# Patient Record
Sex: FEMALE | Race: WHITE | NOT HISPANIC OR LATINO | URBAN - METROPOLITAN AREA
[De-identification: names, ages, dates, MRNs, and addresses within clinical notes are randomized per-mention and may not be internally consistent; named-entity substitution may affect disease eponyms.]

---

## 2017-07-17 ENCOUNTER — TELEPHONE (OUTPATIENT)
Dept: TRANSPLANT | Facility: CLINIC | Age: 47
End: 2017-07-17

## 2017-07-17 NOTE — TELEPHONE ENCOUNTER
Bebe had Emailed stating she was still interested in helping out recip. Recip is now active. Now Emailed Bebe new Phase 1 orders since her prev ones recently .

## 2017-08-02 ENCOUNTER — DOCUMENTATION ONLY (OUTPATIENT)
Dept: TRANSPLANT | Facility: CLINIC | Age: 47
End: 2017-08-02

## 2017-08-04 ENCOUNTER — TELEPHONE (OUTPATIENT)
Dept: TRANSPLANT | Facility: CLINIC | Age: 47
End: 2017-08-04

## 2017-08-04 NOTE — TELEPHONE ENCOUNTER
Informed Bebe that her Phase 1's are all OK. Will now come for eval. Has CD. [place] left [place] in past 3 months. At this time states it is not necessary to have a SW check into financial assist.

## 2017-08-10 DIAGNOSIS — Z00.5 TRANSPLANT DONOR EVALUATION: ICD-10-CM

## 2017-09-14 ENCOUNTER — DOCUMENTATION ONLY (OUTPATIENT)
Dept: TRANSPLANT | Facility: CLINIC | Age: 47
End: 2017-09-14

## 2017-09-21 ENCOUNTER — OFFICE VISIT (OUTPATIENT)
Dept: TRANSPLANT | Facility: CLINIC | Age: 47
End: 2017-09-21
Attending: TRANSPLANT SURGERY

## 2017-09-21 ENCOUNTER — OFFICE VISIT (OUTPATIENT)
Dept: NEPHROLOGY | Facility: CLINIC | Age: 47
End: 2017-09-21
Attending: TRANSPLANT SURGERY

## 2017-09-21 ENCOUNTER — INFUSION THERAPY VISIT (OUTPATIENT)
Dept: INFUSION THERAPY | Facility: CLINIC | Age: 47
End: 2017-09-21
Attending: INTERNAL MEDICINE

## 2017-09-21 ENCOUNTER — ALLIED HEALTH/NURSE VISIT (OUTPATIENT)
Dept: TRANSPLANT | Facility: CLINIC | Age: 47
End: 2017-09-21
Attending: TRANSPLANT SURGERY

## 2017-09-21 VITALS
HEART RATE: 67 BPM | HEIGHT: 63 IN | BODY MASS INDEX: 29.25 KG/M2 | RESPIRATION RATE: 16 BRPM | TEMPERATURE: 97.8 F | OXYGEN SATURATION: 97 % | DIASTOLIC BLOOD PRESSURE: 77 MMHG | WEIGHT: 165.1 LBS | SYSTOLIC BLOOD PRESSURE: 112 MMHG

## 2017-09-21 VITALS — DIASTOLIC BLOOD PRESSURE: 76 MMHG | SYSTOLIC BLOOD PRESSURE: 116 MMHG

## 2017-09-21 VITALS — SYSTOLIC BLOOD PRESSURE: 120 MMHG | DIASTOLIC BLOOD PRESSURE: 80 MMHG

## 2017-09-21 DIAGNOSIS — Z00.5 TRANSPLANT DONOR EVALUATION: ICD-10-CM

## 2017-09-21 DIAGNOSIS — Z00.5 EXAMINATION OF POTENTIAL DONOR OF ORGAN AND TISSUE: Primary | ICD-10-CM

## 2017-09-21 DIAGNOSIS — Z00.5 TRANSPLANT DONOR EVALUATION: Primary | ICD-10-CM

## 2017-09-21 DIAGNOSIS — Z01.818 PRE-OP EXAM: ICD-10-CM

## 2017-09-21 DIAGNOSIS — Z00.5 EXAMINATION OF POTENTIAL DONOR OF ORGAN AND TISSUE: ICD-10-CM

## 2017-09-21 DIAGNOSIS — Z00.5 WILLING TO BE KIDNEY DONOR: Primary | ICD-10-CM

## 2017-09-21 LAB
ABO + RH BLD: NORMAL
ABO + RH BLD: NORMAL
ALBUMIN SERPL-MCNC: 3.8 G/DL (ref 3.4–5)
ALBUMIN UR-MCNC: NEGATIVE MG/DL
ALP SERPL-CCNC: 98 U/L (ref 40–150)
ALT SERPL W P-5'-P-CCNC: 19 U/L (ref 0–50)
ANION GAP SERPL CALCULATED.3IONS-SCNC: 6 MMOL/L (ref 3–14)
APPEARANCE UR: CLEAR
APTT PPP: 30 SEC (ref 22–37)
AST SERPL W P-5'-P-CCNC: 17 U/L (ref 0–45)
BACTERIA #/AREA URNS HPF: ABNORMAL /HPF
BILIRUB SERPL-MCNC: 0.4 MG/DL (ref 0.2–1.3)
BILIRUB UR QL STRIP: NEGATIVE
BLD GP AB SCN SERPL QL: NORMAL
BLOOD BANK CMNT PATIENT-IMP: NORMAL
BUN SERPL-MCNC: 12 MG/DL (ref 7–30)
CALCIUM SERPL-MCNC: 8.7 MG/DL (ref 8.5–10.1)
CHLORIDE SERPL-SCNC: 107 MMOL/L (ref 94–109)
CHOLEST SERPL-MCNC: 196 MG/DL
CMV IGG SERPL QL IA: >8 AI (ref 0–0.8)
CO2 SERPL-SCNC: 27 MMOL/L (ref 20–32)
COLOR UR AUTO: YELLOW
CREAT SERPL-MCNC: 0.7 MG/DL (ref 0.52–1.04)
CREAT UR-MCNC: 154 MG/DL
EBV VCA IGG SER QL IA: 5.8 AI (ref 0–0.8)
EBV VCA IGM SER QL IA: 0.7 AI (ref 0–0.8)
ERYTHROCYTE [DISTWIDTH] IN BLOOD BY AUTOMATED COUNT: 12.3 % (ref 10–15)
GFR SERPL CREATININE-BSD FRML MDRD: 90 ML/MIN/1.7M2
GLUCOSE SERPL-MCNC: 98 MG/DL (ref 70–99)
GLUCOSE UR STRIP-MCNC: NEGATIVE MG/DL
HBA1C MFR BLD: 5.4 % (ref 4.3–6)
HBV CORE AB SERPL QL IA: NONREACTIVE
HBV SURFACE AB SERPL IA-ACNC: 27.97 M[IU]/ML
HBV SURFACE AG SERPL QL IA: NONREACTIVE
HCT VFR BLD AUTO: 41.4 % (ref 35–47)
HCV AB SERPL QL IA: NONREACTIVE
HDLC SERPL-MCNC: 71 MG/DL
HGB BLD-MCNC: 13.9 G/DL (ref 11.7–15.7)
HGB UR QL STRIP: NEGATIVE
HIV 1+2 AB+HIV1 P24 AG SERPL QL IA: NONREACTIVE
INR PPP: 0.95 (ref 0.86–1.14)
KETONES UR STRIP-MCNC: NEGATIVE MG/DL
LDLC SERPL CALC-MCNC: 102 MG/DL
LEUKOCYTE ESTERASE UR QL STRIP: NEGATIVE
MCH RBC QN AUTO: 29.7 PG (ref 26.5–33)
MCHC RBC AUTO-ENTMCNC: 33.6 G/DL (ref 31.5–36.5)
MCV RBC AUTO: 89 FL (ref 78–100)
MICROALBUMIN UR-MCNC: 9 MG/L
MICROALBUMIN/CREAT UR: 5.95 MG/G CR (ref 0–25)
MUCOUS THREADS #/AREA URNS LPF: PRESENT /LPF
NITRATE UR QL: NEGATIVE
NONHDLC SERPL-MCNC: 125 MG/DL
PH UR STRIP: 6 PH (ref 5–7)
PHOSPHATE SERPL-MCNC: 2.4 MG/DL (ref 2.5–4.5)
PLATELET # BLD AUTO: 224 10E9/L (ref 150–450)
POTASSIUM SERPL-SCNC: 4 MMOL/L (ref 3.4–5.3)
PROT SERPL-MCNC: 7.2 G/DL (ref 6.8–8.8)
PROT UR-MCNC: 0.09 G/L
PROT/CREAT 24H UR: 0.06 G/G CR (ref 0–0.2)
RBC # BLD AUTO: 4.68 10E12/L (ref 3.8–5.2)
RBC #/AREA URNS AUTO: 1 /HPF (ref 0–2)
SODIUM SERPL-SCNC: 140 MMOL/L (ref 133–144)
SOURCE: ABNORMAL
SP GR UR STRIP: 1.02 (ref 1–1.03)
SPECIMEN EXP DATE BLD: NORMAL
SQUAMOUS #/AREA URNS AUTO: 1 /HPF (ref 0–1)
T PALLIDUM IGG+IGM SER QL: NEGATIVE
TRIGL SERPL-MCNC: 117 MG/DL
URATE SERPL-MCNC: 4.6 MG/DL (ref 2.6–6)
UROBILINOGEN UR STRIP-MCNC: 2 MG/DL (ref 0–2)
WBC # BLD AUTO: 6.2 10E9/L (ref 4–11)
WBC #/AREA URNS AUTO: 1 /HPF (ref 0–2)

## 2017-09-21 PROCEDURE — 86665 EPSTEIN-BARR CAPSID VCA: CPT | Performed by: INTERNAL MEDICINE

## 2017-09-21 PROCEDURE — 86900 BLOOD TYPING SEROLOGIC ABO: CPT | Performed by: INTERNAL MEDICINE

## 2017-09-21 PROCEDURE — 85027 COMPLETE CBC AUTOMATED: CPT | Performed by: INTERNAL MEDICINE

## 2017-09-21 PROCEDURE — 86850 RBC ANTIBODY SCREEN: CPT | Performed by: INTERNAL MEDICINE

## 2017-09-21 PROCEDURE — 86644 CMV ANTIBODY: CPT | Performed by: INTERNAL MEDICINE

## 2017-09-21 PROCEDURE — 82542 COL CHROMOTOGRAPHY QUAL/QUAN: CPT | Performed by: INTERNAL MEDICINE

## 2017-09-21 PROCEDURE — 84550 ASSAY OF BLOOD/URIC ACID: CPT | Performed by: INTERNAL MEDICINE

## 2017-09-21 PROCEDURE — 83036 HEMOGLOBIN GLYCOSYLATED A1C: CPT | Performed by: INTERNAL MEDICINE

## 2017-09-21 PROCEDURE — 40000269 ZZH STATISTIC NO CHARGE FACILITY FEE: Mod: ZF

## 2017-09-21 PROCEDURE — 86704 HEP B CORE ANTIBODY TOTAL: CPT | Performed by: INTERNAL MEDICINE

## 2017-09-21 PROCEDURE — 86803 HEPATITIS C AB TEST: CPT | Performed by: INTERNAL MEDICINE

## 2017-09-21 PROCEDURE — 25000128 H RX IP 250 OP 636: Mod: JW,ZF | Performed by: INTERNAL MEDICINE

## 2017-09-21 PROCEDURE — 85730 THROMBOPLASTIN TIME PARTIAL: CPT | Performed by: INTERNAL MEDICINE

## 2017-09-21 PROCEDURE — 81001 URINALYSIS AUTO W/SCOPE: CPT | Performed by: INTERNAL MEDICINE

## 2017-09-21 PROCEDURE — 86480 TB TEST CELL IMMUN MEASURE: CPT | Performed by: INTERNAL MEDICINE

## 2017-09-21 PROCEDURE — 84156 ASSAY OF PROTEIN URINE: CPT | Performed by: INTERNAL MEDICINE

## 2017-09-21 PROCEDURE — 85610 PROTHROMBIN TIME: CPT | Performed by: INTERNAL MEDICINE

## 2017-09-21 PROCEDURE — 86780 TREPONEMA PALLIDUM: CPT | Performed by: INTERNAL MEDICINE

## 2017-09-21 PROCEDURE — 40000866 ZZHCL STATISTIC HIV 1/2 ANTIGEN/ANTIBODY PRETRANSPLANT ONLY: Performed by: INTERNAL MEDICINE

## 2017-09-21 PROCEDURE — 86706 HEP B SURFACE ANTIBODY: CPT | Performed by: INTERNAL MEDICINE

## 2017-09-21 PROCEDURE — 87340 HEPATITIS B SURFACE AG IA: CPT | Performed by: INTERNAL MEDICINE

## 2017-09-21 PROCEDURE — 80053 COMPREHEN METABOLIC PANEL: CPT | Performed by: INTERNAL MEDICINE

## 2017-09-21 PROCEDURE — 82043 UR ALBUMIN QUANTITATIVE: CPT | Performed by: INTERNAL MEDICINE

## 2017-09-21 PROCEDURE — 80061 LIPID PANEL: CPT | Performed by: INTERNAL MEDICINE

## 2017-09-21 PROCEDURE — 86901 BLOOD TYPING SEROLOGIC RH(D): CPT | Performed by: INTERNAL MEDICINE

## 2017-09-21 PROCEDURE — 84100 ASSAY OF PHOSPHORUS: CPT | Performed by: INTERNAL MEDICINE

## 2017-09-21 RX ORDER — SERTRALINE HYDROCHLORIDE 100 MG/1
100 TABLET, FILM COATED ORAL
COMMUNITY
Start: 2017-08-25

## 2017-09-21 RX ORDER — LEVOTHYROXINE SODIUM 150 UG/1
150 TABLET ORAL
COMMUNITY
Start: 2017-08-25

## 2017-09-21 RX ORDER — ESTRADIOL 1 MG/1
1 TABLET ORAL
COMMUNITY
Start: 2017-08-29

## 2017-09-21 RX ADMIN — IOHEXOL 5 ML: 300 INJECTION, SOLUTION INTRAVENOUS at 08:46

## 2017-09-21 ASSESSMENT — PAIN SCALES - GENERAL: PAINLEVEL: NO PAIN (0)

## 2017-09-21 NOTE — MR AVS SNAPSHOT
After Visit Summary   9/21/2017    Lucille Lim    MRN: [id number]           Patient Information     Date Of Birth          1970        Visit Information        Provider Department      9/21/2017 10:00 AM Desi Toro S.A., RN [facility] [place] [facility] Solid Organ Transplant        Today's Diagnoses     Examination of potential donor of organ and tissue    -  1       Follow-ups after your visit        Your next 10 appointments already scheduled     Sep 21, 2017  3:00 PM CDT   (Arrive by 2:45 PM)   CT RENAL ANGIO with UCCT2   [facility] [place] [facility] [place] [facility] ([facility])    [address] [address] [phone]           Please bring any scans or X-rays taken at other hospitals, if similar tests were done. Also bring a list of your medicines, including vitamins, minerals and over-the-counter drugs. It is safest to leave personal items at home.  Be sure to tell your doctor:   If you have any allergies.   If there s any chance you are pregnant.   If you are breastfeeding.   If you have any special needs.  You will have contrast for this exam. To prepare:   Do not eat or drink for 2 hours before your exam. If you need to take medicine, you may take it with small sips of water. (We may ask you to take liquid medicine as well.)   The day before your exam, drink extra fluids at least six 8-ounce glasses (unless your doctor tells you to restrict your fluids).  Patients over 70 or patients with diabetes or kidney problems:   If you haven t had a blood test (creatinine test) within the last 30 days, go to your clinic or Diagnostic Imaging Department for this test.  If you have diabetes:   If your kidney function is normal, continue taking your metformin (Avandamet, Glucophage, Glucovance, Metaglip) on the day of your exam.   If your kidney function is abnormal, wait 48 hours before restarting this medicine.  Please wear loose clothing, such as a sweat suit or  "jogging clothes. Avoid snaps, zippers and other metal. We may ask you to undress and put on a hospital gown.  If you have any questions, please call the Imaging Department where you will have your exam.            Sep 21, 2017  3:20 PM CDT   (Arrive by 3:05 PM)   XR CHEST 2 VIEWS with UCXR1   [facility] Xray ([facility])    [address]  1st Floor  [facility] [address] [phone]           Please bring a list of your current medicines to your exam. (Include vitamins, minerals and over-thecounter medicines.) Leave your valuables at home.  Tell your doctor if there is a chance you may be pregnant.  You do not need to do anything special for this exam.              Future tests that were ordered for you today     Open Future Orders        Priority Expected Expires Ordered    ABO type Routine 9/21/2017 10/27/2017 9/21/2017            Who to contact     If you have questions or need follow up information about today's clinic visit or your schedule please contact [facility] SOLID ORGAN TRANSPLANT directly at [phone].  Normal or non-critical lab and imaging results will be communicated to you by [email or web address].comhart, letter or phone within 4 business days after the clinic has received the results. If you do not hear from us within 7 days, please contact the clinic through CoachUpt or phone. If you have a critical or abnormal lab result, we will notify you by phone as soon as possible.  Submit refill requests through [employer] or call your pharmacy and they will forward the refill request to us. Please allow 3 business days for your refill to be completed.          Additional Information About Your Visit        RecentPoker.comhar[employer] Information     [employer] lets you send messages to your doctor, view your test results, renew your prescriptions, schedule appointments and more. To sign up, go to [email or web address] . Click on \"Log in\" on the left side of the screen, which will take you to the " "Welcome page. Then click on \"Sign up Now\" on the right side of the page.     You will be asked to enter the access code listed below, as well as some personal information. Please follow the directions to create your username and password.     Your access code is: [email or web address]  Expires: 2017  6:30 AM     Your access code will  in 90 days. If you need help or a new code, please call your [place] clinic or [phone].        Care EveryWhere ID     This is your Care EveryWhere ID. This could be used by other organizations to access your [place] medical records  [id number]         Blood Pressure from Last 3 Encounters:   17 116/76   17 120/80   17 112/77    Weight from Last 3 Encounters:   17 74.9 kg (165 lb 1.6 oz)              Today, you had the following     No orders found for display       Primary Care Provider    Provider Not In System                Equal Access to Services     [facility]: Hadii aad ku hadasho Sobarbieali, waaxda luqadaha, qaybta kaalmada adeegyada, waxay derikin hayjennifern levon malcolm . So [facility] [phone].    ATENCIÓN: Si habla español, tiene a campos disposición servicios gratuitos de asistencia lingüística. Llame al [phone].    We comply with applicable federal civil rights laws and [place] laws. We do not discriminate on the basis of race, color, national origin, age, disability sex, sexual orientation or gender identity.            Thank you!     Thank you for choosing [facility] SOLID ORGAN TRANSPLANT  for your care. Our goal is always to provide you with excellent care. Hearing back from our patients is one way we can continue to improve our services. Please take a few minutes to complete the written survey that you may receive in the mail after your visit with us. Thank you!             Your Updated Medication List - Protect others around you: Learn how to safely use, store and throw away your medicines at www.disposemymeds.org.          This " list is accurate as of: 9/21/17  2:59 PM.  Always use your most recent med list.                   Brand Name Dispense Instructions for use Diagnosis    estradiol 1 MG tablet    ESTRACE     Take 1 mg by mouth        levothyroxine 150 MCG tablet    SYNTHROID/LEVOTHROID     Take 150 mcg by mouth        sertraline 100 MG tablet    ZOLOFT     Take 100 mg by mouth        VITAMIN D (CHOLECALCIFEROL) PO      Take 5,000 Units by mouth daily        ZANTAC 150 MG tablet   Generic drug:  ranitidine      Take 150 mg by mouth 2 times daily

## 2017-09-21 NOTE — MR AVS SNAPSHOT
After Visit Summary   9/21/2017    Lucille Lim    MRN: [id number]           Patient Information     Date Of Birth          1970        Visit Information        Provider Department      9/21/2017 9:00 AM Holli Hughes [facility] Solid Organ Transplant        Today's Diagnoses     Transplant donor evaluation    -  1       Follow-ups after your visit        Your next 10 appointments already scheduled     Sep 21, 2017  1:00 PM CDT   (Arrive by 12:30 PM)   Kidney Donor Evaluation with Aubrey Thompson MD   [facility] Nephrology ([facility])    [address] [place] [address]   [phone]            Sep 21, 2017  2:00 PM CDT   NUTRITION VISIT with Juanita Carmichael RD   [facility] Solid Organ Transplant ([facility])    [address] [place] [address]   [phone]            Sep 21, 2017  3:00 PM CDT   (Arrive by 2:45 PM)   CT RENAL ANGIO with UCCT2   [facility] [place] [facility] ([facility])    [address] [place] [address]   [phone]           Please bring any scans or X-rays taken at other hospitals, if similar tests were done. Also bring a list of your medicines, including vitamins, minerals and over-the-counter drugs. It is safest to leave personal items at home.  Be sure to tell your doctor:   If you have any allergies.   If there s any chance you are pregnant.   If you are breastfeeding.   If you have any special needs.  You will have contrast for this exam. To prepare:   Do not eat or drink for 2 hours before your exam. If you need to take medicine, you may take it with small sips of water. (We may ask you to take liquid medicine as well.)   The day before your exam, drink extra fluids at least six 8-ounce glasses (unless your doctor tells you to restrict your fluids).  Patients over 70 or patients with diabetes  or kidney problems:   If you haven t had a blood test (creatinine test) within the last 30 days, go to your clinic or Diagnostic Imaging Department for this test.  If you have diabetes:   If your kidney function is normal, continue taking your metformin (Avandamet, Glucophage, Glucovance, Metaglip) on the day of your exam.   If your kidney function is abnormal, wait 48 hours before restarting this medicine.  Please wear loose clothing, such as a sweat suit or jogging clothes. Avoid snaps, zippers and other metal. We may ask you to undress and put on a hospital gown.  If you have any questions, please call the Imaging Department where you will have your exam.            Sep 21, 2017  3:20 PM CDT   (Arrive by 3:05 PM)   XR CHEST 2 VIEWS with UCXR1   [facility]ray ([facility])    [address] [address] [phone]           Please bring a list of your current medicines to your exam. (Include vitamins, minerals and over-thecounter medicines.) Leave your valuables at home.  Tell your doctor if there is a chance you may be pregnant.  You do not need to do anything special for this exam.              Future tests that were ordered for you today     Open Future Orders        Priority Expected Expires Ordered    ABO type Routine 9/21/2017 10/27/2017 9/21/2017            Who to contact     If you have questions or need follow up information about today's clinic visit or your schedule please contact [facility] SOLID ORGAN TRANSPLANT directly at [phone].  Normal or non-critical lab and imaging results will be communicated to you by Yippee Artshart, letter or phone within 4 business days after the clinic has received the results. If you do not hear from us within 7 days, please contact the clinic through Expensify or phone. If you have a critical or abnormal lab result, we will notify you by phone as soon as possible.  Submit refill requests through [employer]  "or call your pharmacy and they will forward the refill request to us. Please allow 3 business days for your refill to be completed.          Additional Information About Your Visit        MyChart Information     "[employer]hart lets you send messages to your doctor, view your test results, renew your prescriptions, schedule appointments and more. To sign up, go to [email or web address] . Click on \"Log in\" on the left side of the screen, which will take you to the Welcome page. Then click on \"Sign up Now\" on the right side of the page.     You will be asked to enter the access code listed below, as well as some personal information. Please follow the directions to create your username and password.     Your access code is: [email or web address]  Expires: 2017  6:30 AM     Your access code will  in 90 days. If you need help or a new code, please call your [place] clinic or [phone].        Care EveryWhere ID     This is your Care EveryWhere ID. This could be used by other organizations to access your [place] medical records  [id number]         Blood Pressure from Last 3 Encounters:   17 112/77    Weight from Last 3 Encounters:   17 74.9 kg (165 lb 1.6 oz)              Today, you had the following     No orders found for display       Primary Care Provider    Provider Not In System                Equal Access to Services     [facility]: Hadii aad ku hadasho Sobarbieali, waaxda luqadaha, qaybta kaalmada adeegyada, nixon malcolm . So [facility] [phone].    ATENCIÓN: Si habla español, tiene a campos disposición servicios gratuitos de asistencia lingüística. Llame al [phone].    We comply with applicable federal civil rights laws and [place] laws. We do not discriminate on the basis of race, color, national origin, age, disability sex, sexual orientation or gender identity.            Thank you!     Thank you for choosing [facility] [place] [facility] SOLID ORGAN TRANSPLANT  for your care. Our goal " is always to provide you with excellent care. Hearing back from our patients is one way we can continue to improve our services. Please take a few minutes to complete the written survey that you may receive in the mail after your visit with us. Thank you!             Your Updated Medication List - Protect others around you: Learn how to safely use, store and throw away your medicines at www.disposemymeds.org.          This list is accurate as of: 9/21/17 12:41 PM.  Always use your most recent med list.                   Brand Name Dispense Instructions for use Diagnosis    estradiol 1 MG tablet    ESTRACE     Take 1 mg by mouth        levothyroxine 150 MCG tablet    SYNTHROID/LEVOTHROID     Take 150 mcg by mouth        sertraline 100 MG tablet    ZOLOFT     Take 100 mg by mouth        VITAMIN D (CHOLECALCIFEROL) PO      Take 5,000 Units by mouth daily        ZANTAC 150 MG tablet   Generic drug:  ranitidine      Take 150 mg by mouth 2 times daily

## 2017-09-21 NOTE — MR AVS SNAPSHOT
After Visit Summary   9/21/2017    Lucille Lim    MRN: [id number]           Patient Information     Date Of Birth          1970        Visit Information        Provider Department      9/21/2017 12:30 PM Oswald Go MD [facility] Solid Organ Transplant        Today's Diagnoses     Transplant donor evaluation    -  1       Follow-ups after your visit        Your next 10 appointments already scheduled     Sep 21, 2017 10:40 AM CDT   (Arrive by 10:25 AM)   ecg with  CV EKG   [facility] Xray ([facility])    [address] [place] [id number] [phone]            Sep 21, 2017 12:30 PM CDT   (Arrive by 12:15 PM)   Kidney Donor Evaluation with Oswald Go MD   [facility] Solid Organ Transplant ([facility])    [address] [place] [address]   [phone]            Sep 21, 2017  1:00 PM CDT   (Arrive by 12:30 PM)   Kidney Donor Evaluation with Aubrey Thompson MD   [facility] Nephrology ([facility])    [address] [place] [address]   [phone]            Sep 21, 2017  2:00 PM CDT   NUTRITION VISIT with Juanita Carmichael RD   [facility] Solid Organ Transplant ([facility])    [address] [place] [address]   [phone]            Sep 21, 2017  3:00 PM CDT   (Arrive by 2:45 PM)   CT RENAL ANGIO with UCCT2   [facility] CT ([facility])    [address] [place] [id number] [phone]           Please bring any scans or X-rays taken at other hospitals, if similar tests were done. Also bring a list of your medicines, including vitamins, minerals and over-the-counter drugs. It is safest to leave personal items at home.  Be sure to tell your doctor:   If you have any allergies.   If there s any  chance you are pregnant.   If you are breastfeeding.   If you have any special needs.  You will have contrast for this exam. To prepare:   Do not eat or drink for 2 hours before your exam. If you need to take medicine, you may take it with small sips of water. (We may ask you to take liquid medicine as well.)   The day before your exam, drink extra fluids at least six 8-ounce glasses (unless your doctor tells you to restrict your fluids).  Patients over 70 or patients with diabetes or kidney problems:   If you haven t had a blood test (creatinine test) within the last 30 days, go to your clinic or Diagnostic Imaging Department for this test.  If you have diabetes:   If your kidney function is normal, continue taking your metformin (Avandamet, Glucophage, Glucovance, Metaglip) on the day of your exam.   If your kidney function is abnormal, wait 48 hours before restarting this medicine.  Please wear loose clothing, such as a sweat suit or jogging clothes. Avoid snaps, zippers and other metal. We may ask you to undress and put on a hospital gown.  If you have any questions, please call the Imaging Department where you will have your exam.            Sep 21, 2017  3:20 PM CDT   (Arrive by 3:05 PM)   XR CHEST 2 VIEWS with UCXR1   [facility] [place] [facility]ray ([facility])    [address] [place] [address] [phone]           Please bring a list of your current medicines to your exam. (Include vitamins, minerals and over-thecounter medicines.) Leave your valuables at home.  Tell your doctor if there is a chance you may be pregnant.  You do not need to do anything special for this exam.              Future tests that were ordered for you today     Open Future Orders        Priority Expected Expires Ordered    ABO type Routine 9/21/2017 10/27/2017 9/21/2017            Who to contact     If you have questions or need follow up information about today's clinic visit or  "your schedule please contact [facility] SOLID ORGAN TRANSPLANT directly at [phone].  Normal or non-critical lab and imaging results will be communicated to you by MyChart, letter or phone within 4 business days after the clinic has received the results. If you do not hear from us within 7 days, please contact the clinic through MyChart or phone. If you have a critical or abnormal lab result, we will notify you by phone as soon as possible.  Submit refill requests through [employer] or call your pharmacy and they will forward the refill request to us. Please allow 3 business days for your refill to be completed.          Additional Information About Your Visit        [employer] Software[employer] Information     [employer] lets you send messages to your doctor, view your test results, renew your prescriptions, schedule appointments and more. To sign up, go to [email or web address] . Click on \"Log in\" on the left side of the screen, which will take you to the Welcome page. Then click on \"Sign up Now\" on the right side of the page.     You will be asked to enter the access code listed below, as well as some personal information. Please follow the directions to create your username and password.     Your access code is: [email or web address]  Expires: 2017  6:30 AM     Your access code will  in 90 days. If you need help or a new code, please call your [place] clinic or [phone].        Care EveryWhere ID     This is your Care EveryWhere ID. This could be used by other organizations to access your [place] medical records  [id number]         Blood Pressure from Last 3 Encounters:   17 112/77    Weight from Last 3 Encounters:   17 74.9 kg (165 lb 1.6 oz)              Today, you had the following     No orders found for display       Primary Care Provider    Provider Not In System                Equal Access to Services     YANA TIRADO : Corinne Allan, federico goldman, nixon zaman " mamie munirbrad jamesdex lalakia ah. So [facility] [phone].    ATENCIÓN: Si habla corwin, tiene a campos disposición servicios gratuitos de asistencia lingüística. Eulogio al [phone].    We comply with applicable federal civil rights laws and [place] laws. We do not discriminate on the basis of race, color, national origin, age, disability sex, sexual orientation or gender identity.            Thank you!     Thank you for choosing [facility] SOLID ORGAN TRANSPLANT  for your care. Our goal is always to provide you with excellent care. Hearing back from our patients is one way we can continue to improve our services. Please take a few minutes to complete the written survey that you may receive in the mail after your visit with us. Thank you!             Your Updated Medication List - Protect others around you: Learn how to safely use, store and throw away your medicines at www.disposemymeds.org.          This list is accurate as of: 9/21/17 10:11 AM.  Always use your most recent med list.                   Brand Name Dispense Instructions for use Diagnosis    estradiol 1 MG tablet    ESTRACE     Take 1 mg by mouth        levothyroxine 150 MCG tablet    SYNTHROID/LEVOTHROID     Take 150 mcg by mouth        sertraline 100 MG tablet    ZOLOFT     Take 100 mg by mouth        VITAMIN D (CHOLECALCIFEROL) PO      Take 5,000 Units by mouth daily        ZANTAC 150 MG tablet   Generic drug:  ranitidine      Take 150 mg by mouth 2 times daily

## 2017-09-21 NOTE — PROGRESS NOTES
Psychosocial Evaluation  Living Organ Donation per OPTN Policy 14.1.A  Organ Type: unrelated kidney  Presenting Information:  Ms. Lan Lim presents to the [facility] to complete a psychosocial evaluation since she is interested in becoming a kidney donor to her friend, Ms. Holli Fung, age 36.  Ms. Lim is a 47 year old, ,white, American female.  She is a [place] Citizen.  She is in clinic alone today.    PERSONAL BACKGROUND:  Current Living Situation: Ms. Lim currently lives in [place], [place], which is a 2 1/2 hour drive from the [facility].  She lives in a single family home that she owns with her boyfriend.    Education/Employment/Financial Situation: Ms. Lim completed a 4 year degree.  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  She has insurance through her [employer] job.  She estimates that her household income is $75,000 per year.  She does not have paid time off from her hospital job, since she is casual.  She does have some donor leave benefits from her job as a dialysis nurse through [employer].  Ms. Lim denies that she will suffer a financial hardship as a result of living kidney donation.  We discussed her applying for NLDAC, since it appears that her recipient may be within income guidelines so she would qualify.    Family History: Ms. Lim has one son, age 23, who is living and working full time as a  in [place]. Ms. Lim has been  for 20 years.  She is in a relationship with her boyfriend, Carlos Alberto, for the last 18 years.  They live together.     General Health: Ms. Lim considers herself to be in excellent general health.  She has had surgery, a hysterectomy, in the past and recovered well from that procedure.      Mental  Health: Ms. Lim has taking Zoloft for management of mild depression for the 10 years.  She denies any history of suicidal ideation, plans, or past attempts.  She denies any history of psychiatric hospitalizations.  She feels that her depression is well managed on Zoloft and does not seem to interfere with her job performance or relationships.    Alcohol and Drug Use/Abuse/Dependency: Denies any past history of abuse or dependency on alcohol or illicit drugs. Denies any current use of street drugs, including marijuana.  Denies any past history of negative consequences of use of alcohol or drugs such as a DUI, relationship problems, or problems with work or home life.   Ms. Lim reports that she drinks every few months, when they travel or are with friends.  She may have 4 or 5 servings of alcohol when she drinks.    Cigarette Use: Ms. Lim denies smoking cigarettes.  She quit smoking in 2008.  She used to smoke 1 pack per day for 15 years.    Legal: Ms. Lim denies any legal issues.    Coping Strategies/Support System: Ms. Lim reports that her long time boyfriend, Carlos Alberto, is not fully supportive of her desire to be a living kidney donor.  She states that his objective is that the potential recipient has had many social problems including substance abuse.  Ms. Lim states that she thinks that Carlos Alberto will support her if she ultimately decides to be a donor, but she knows that she will need his help and support.    DONOR SPECIFIC INFORMATION:  Relationship to Recipient: Ms. Lucille Lim wants to donate a kidney to a friend, Ms. Holli Fung, age 36.  Holli used to be in a relationship with Ms. Lim's son for about 5 years.  They are no longer in a relationship together.  Ms. Lim reports that she does not know all of the details of Holli's kidney disease, but does know that she had a kidney transplant from her uncle when she was 10 years old.  She is currently on dialysis  "and has been for several years.    Decision Process/Motivation to Donate: Ms. Lim reports that she is motivated to help Holli Fung because \"she deserves a second chance in life\".  Ms. Lim states that she has had some really tough family dynamics and some karen road as a young adult.  Ms. Lim states that she would prefer to be part of a paired kidney donation program, even if she is able to give to Holli directly.  She states that since they both live in [place] very [place] town, she would prefer if her actual kidney go to someone else.  She feels that this way, she would not feel like she had judgements about Holli's behavior should they see one another out in the community.  Ms. Lim denies feeling any pressure or coercion.  She denies being offered any form of payment to be a donor.      PREPARATION FOR DONATION, RECOVERY, AND POTENTIAL SHORT-LONG-TERM OUTCOMES:  Understanding of the Living Donation Process:   We discussed the role of the donor  and Independent Donor Advocate.  Short and long term medical and psychosocial risks to both, donor and recipient were reviewed and she appears to understand these risks.  High risk behaviors as defined by [place] Public Health Services (PHS) 2013 that have potential to increase risk of disease transmission were reviewed and she denies high risk behaviors. Post surgical restrictions (2 weeks no driving, 6 weeks no lifting over 10 lbs) were reviewed and she appears capable of adhering to the post surgical requirements. The need for a caregiver was discussed and she would need support from her partner, Carlos Alberto,who is still somewhat on the fence about his support of her being a living kidney donor .  The risk of poor psychosocial outcome including problems with body image, post-surgery depression or anxiety, or feelings of emotional distress or bereavement if recipient experiences any recurrent disease, poor outcome or death was reviewed.  " Additionally, potential financial implications, including the risk of having difficulty obtaining health care insurance, life insurance, disability insurance, or long term care insurance were reviewed, as were available donor grants to assist with donor related expenses.      We also discussed some unique issues that arise with paired kidney donation, which include the uncertainty of the timing and the importance of having a employment situation and support system that is able to provide sustained support and flexibility.    Ms. Lim appears capable of understanding this information and making an informed medical decision.    Impressions/Recommendations:   Ms. Lucille Lim  is highly motivated to donate kidney  to her friend, Ms. Holli Fung, age 36.  Her decision to donate is free of inducement, coercion, or other undue pressure.   Her housing, finances and employment are stable.  No current/active mental health or chemical abuse issues were identified.  The need for a caregiver was reviewed and she is able to identify a plan to meet her post operative care needs.  She appears capable of making an informed medical decision.  No psychosocial contraindications to living organ donation were identified and  I support Ms. Lim s desire to donate a kidney to her friend, Ms. Holli Fung.         Contact Information:   DANIA Griffin, [facility]  Clinical  and Independent Donor Advocate  [facility]  Pager:  [phone]  Direct:  [phone]      Time Spent: 60 minutes      Living Kidney Donor Consent per OPTN Policy 14.3.A for Independent Living Donor Advocate (JAGRUTI)    Written assurance has been obtained from the potential donor that he/she:   Is willing to donate  Is free from inducement and coercion  Has been informed that the he/she may decline to donate at any time  Has been informed that transplant centers must:   A) Offer donors an  opportunity to discontinue the donor consent or evaluation process in a way that is protected and confidential  B) Provide an independent living donor advocate (JAGRUTI) to assist the potential donor during this process    The following was presented to the potential donor in a language in which the potential donor is able to engage in meaningful dialogue:   Education and instruction about all phases of the living donation process including:   Consent  Medical and psychosocial evaluations  Pre and post operative care  Required post operative follow up  Disclosure that the [facility] will take all reasonable precautions to provide confidentiality for the donor/recipient  Disclosure that it is a federal crime for any person to knowingly acquire, obtain or otherwise transfer any human organ for valuable consideration  Disclosure that the [facility] must provide an independent living donor advocate (JAGRUTI)  Disclosure that health information obtained during the evaluation is subject to the same regulations as all records and could reveal conditions that must be reported to local, state, or federal public health authorities  Disclosure that the [facility] is required to report living donor follow up information at 6 months, 1 year, and 2 years, and that the potential donor must commit to post operative follow up testing coordinated by the [facility]    Disclosure has been provided that these risks may be transient or permanent & include but are not limited to:  Potential psychosocial risks:  Problems with body image  Post-surgery depression or anxiety  Feelings of emotional distress or bereavement if recipient experiences any recurrent disease or in the event of the recipient s death  Impact of donation on the donor s lifestyle    Potential financial impacts:  Personal expenses of travel, housing, , lost wages related to donation might not be reimbursed. However, resources may be  available to defray some donation-related expenses   Need for life-long follow up at the donor s expense  Loss of employment or income  Negative impact on the ability to obtain future employment  Negative impact on the ability to obtain, maintain, or afford health, disability, and life insurance  Future health problems experienced by living donors following donation may not be covered by the recipient s insurance    Contact Information:  DANIA Griffin, [facility]  Clinical  and Independent Donor Advocate  [facility]  Pager:  [phone]  Direct:  [phone]    Time Spent: 60 minutes

## 2017-09-21 NOTE — NURSING NOTE
Saw Lucille Lim in clinic during kidney donor evaluation.  Has offered to be donor to Paired exchange program for Holli Fung.  Lucille would like to choose to participate in Kidney paired exchange rather than give directly to the recipient.  Reason being that they [place] town and she would like to remove herself from giving directly to her, but still do something to benefit Holli.    Discussed surgery hospitalization and recovery.  Knows when and how to obtain evaluation results and the process for scheduling surgery.  Has received and reviewed the donor education materials including donor DVD.  Signed consent for donor evaluation.  Reviewed SRTR Datasheet.  She was [place] in the [place]  She lives in [place], her family has a beef livestock.  She works as a nurse.  Requested routine cancer screening tests:     1.Pap.- She does not need as the patient has had a total hysterectomy.     2.  Mammo      Time spent 20 minutes.      I reviewed details pertaining to the Paired Exchange programs.       1. National Kidney Registry    2. [place] for Paired donation    3. [facility] KPD Program    4. Internal Exchange at the [facility]    Matching Procedure and Logistics    Reviewed that donors and candidates do not choose their match and that they may decline a match. Explained examples of potential chain/swap scenarios and demonstrated how more than one candidate can receive a transplant in these exchanges.   Reviewed that the [facility] waiting time is unknown, the intended recipient's antibody panel and reviewed their ABO match power.   Frequent lab draws are necessary in the D programs. APD and NKR will send a kit when we initiate the listing to store the blood by freezing the sample and use it for exploratory crossmatches. Once a match offer is made, a fresh blood sample will be needed for the final confirmatory compatibility testing, as well as infectious disease testing.  I reviewed billing policy  for the donor evaluation and for the KPD program.  Bridging  KPD programs may need to have the donor wait a period of time after the recipient receives their kidney, in order to determine matching and logistics for the next cluster of a chain. If donor consents to be a bridge donor, the time waiting for the swap cluster to take place is unknown. The donor stated that she DOES NOT want to be a bridge donor. Further blood work may be needed if they are waiting to bridge one year after the initial evaluation.  Unexpected Outcome  I discussed possibility of an unexpected event on the day of transplant. I explained how their donated kidney would be backed up here locally as well as in the destination city in the event that the matched recipient is unable to receive the donated kidney.   I reviewed the KPD programs remedy for failed KPD exchanges, and the remedy does not include any additional priority for the paired candidate on the  donor waiting list. The outcome of the matched recipient may be different from the intended recipient's outcome.  Shipping  The kidney's are transported to the matched recipient's in the exchanges via an approved  service to the airport and then flown fixed wing to the intended destination.GPS devices are used in all the NKR exchanges for close monitoring. Risk included in shipping include damage or loss related to unforseen situations; car crash, airplane crash, terrorist events, etc.   Communication  Donors can communicate with the recipient by giving all correspondence to the Transplant Coordinator, omitting all personal identifiers. The Transplant Coordinator will review and deliver to the recipients Coordinator.  Rights  Donors have a right to withdraw from participation in the KPD program at any time.     Donor has signed consent for:  *National Kidney Registry  *[employer] for Organ Sharing, KPD  *[place] for Paired Donation  *[facility] Internal Exchange  *Bridge  Donation  *Shipping Risks  *Release of Information form used for sharing evaluation clinical data to recipient transplant center.     Questions answered.      Living Kidney Donor Consent per OPTN Policy for Transplant Coordinators     Written assurance has been obtained from the patient that the donor:   1) Is willing to donate  2) Is free from inducement and coercion  3) Has been informed that the donor may decline to donate at any time  4) Has been informed that transplant centers must:   A) Offer donors an opportunity to discontinue the donor consent or evaluation process in a way that is protected and confidential  B) Provide an independent donor advocate (LENIN) to assist the potential donor during this process     The following was presented in a language in which donor is able to engage in meaningful dialogue and was reviewed and discussed with the patient by the transplant coordinator and the physician.      The following has been provided:   Education and instruction about all phases of the living donation process includin) Consent  2) The donor must undergo medical and psychosocial evaluations  3) Disclosure that the recovery hospital will take all reasonable precautions to provide confidentiality for the donor/recipient  4) Disclosure that it is a federal crime for any person to knowingly acquire, obtain or otherwise transfer any human organ for valuable consideration  5) The transplant hospital may refuse the potential donor, and the donor could be evaluated by another transplant program with different selection criteria  6) Data from the most recent SRTR center-specific reports:   A) National 1-year patient and graft survival rates  B) Hospital s 1-year patient and graft survival rates  C) Notification about all CMS outcome requirements not being met by the recovery and recipient s hospitals     The following has been provided:   1) Education about expected post-donation kidney function and how  chronic kidney disease and end-stage renal disease might potentially impact the donor in the future to include:  A) On average, donors will have 25-35% permanent loss of kidney function at donation  B) Baseline risk of End Stage Renal Disease (ESRD) does not exceed that of members of general population with same demographic profile  C) Donor risks must be interpreted in light of known epidemiology of both Chronic Kidney Disease (CKD) or ESRD  D) CKD generally develops in midlife (40-50 years old)  E) ESRD generally develops after age 60  F) Medical evaluation of young potential donor cannot predict lifetime risk  2) Donors may be at higher risk for CKD if they sustain damage to the remaining kidney. 3) Development of CKD and progression to ESRD may be more rapid with only 1 kidney  4) Dialysis is required when reaching ESRD  5) Current practice prioritizes prior living kidney donors who became kidney transplant candidates  6) Alternate procedures or courses of treatment for the recipient, including  donor transplant  A) A  donor kidney may become available for the recipient before donor evaluation is complete or transplant occurs  B) Any transplant candidate may have risk factors for increased morbidity or mortality that are not disclosed to the potential donor  7) The patient will receive a thorough medical and psychosocial evaluation  8) Health information obtained during the evaluation is subject to the same regulations as all records and could reveal conditions that must be reported to local, state, or federal public health authorities  9) The [facility] [place], is required to report living donor follow up information at 6, 12, and 24 months  10) Potential donors must commit to post operative follow up testing coordinated by the [facility]  11) Any infectious disease or malignancy pertinent to acute recipient care discovered during the potential donor s first two years of  follow up care:  A) Will be disclosed to the donor  B) May need to be reported to local, state or federal public health authorities  C) Will be disclosed to their recipient s transplant center, and  D) Will be reported through the OPTN Improving Patient Safety Portal     Disclosure has been provided that these risks may be transient or permanent & include but are not limited to potential medical or surgical risks:  Death  Scars, pain, fatigue, and other consequences typical of any surgical procedure  Decreased kidney function  Abdominal or bowel symptoms such as bloating and nausea, and developing bowel obstruction  Kidney failure and the need for dialysis or kidney transplant for the donor  Impact of obesity, hypertension or other donor-specific medical conditions on morbidity and mortality of the potential donor.    Questions answered.    09/21/17 2:41 PM

## 2017-09-21 NOTE — LETTER
9/21/2017        RE: Lucille Lim  [address] [address] [place] [address]     Dear Colleague,    Thank you for referring your patient, Lucille Lim, to the [facility] NEPHROLOGY at [facility]. Please see a copy of my visit note below.    Assessment and Plan:  1. Prospective kidney transplant donor with some issues to be addressed prior to donation. Patient's blood pressure is acceptable at this visit, kidney function appears to be acceptable with Iohexol pending, and urinalysis is bland.    2. Mild hypophosphatemia: repeat in 2 weeks.     3. Bacteruria: asymptomatic with no leukocyturia. No treatment needed at this time.     4. + quantiferon: ID referral for evaluation and management of potential latent TB.    Discussed the risks of donating a kidney, including the surgical risk and the possible risks of living with one kidney.    I spent 45 minutes with this patient of which > 25 minutes were spent face to face counseling regarding risks, benefits and alternatives of being kidney donor. All questions were answered.    Education about expected post-donation kidney function and how chronic kidney disease (CKD) and end stage kidney disease (ESKD) might potentially impact the donor in the future, include, but not limited to:       - On average, donors will have 25-35% permanent loss of kidney function at donation.       - Baseline risk of ESKD may slightly exceed that of members of the general population with the same demographic profile.       - Donor risks must be interpreted in light of known epidemiology of both CKD or ESKD, such as that CKD generally develops in midlife (40-50 years old) and ESKD generally develops after age 60.       - Medical evaluation of young potential donors cannot predict lifetime risk of CKD or ESKD.       - Donors may be at higher risk for CKD if they sustain damage to the remaining kidney.       - Development of CKD and progression of ESKD may be  more rapid with only 1 kidney.       - Some type of kidney replacement therapy, either kidney transplant or dialysis, is required when reaching ESKD.    Potential medical or surgical risks include, but not limited to:       - Death.       - Scars, pain, fatigue, and other consequences typical of any surgical procedure.       - Decreased kidney function.       - Abdominal or bowel symptoms, such as bloating and nausea, and developing bowel obstruction.       - Kidney failure (ESKD) and the need for a kidney transplant or dialysis for the donor.       - Impact of obesity, hypertension, or other donor-specific medical conditions on morbidity and mortality of the potential donor.    Patients overall evaluation will be discussed with the transplant team and a final recommendation on the patients' suitability to be a kidney transplant donor will be made at that time.    Consult:  Lucille Lim was seen in consultation at the request of Dr. Oswald Go for evaluation as a potential kidney transplant donor.    Reason for Visit:  Lucille Lim is a 47 year old female who presents for a kidney donor evaluation.  Patient would like to donate to the pair exchange for a friend.    HPI:    Patient is a 46 yo female with history of hypothyroidism on levothyroxine. She developed weight gain and TSH was elevated. 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     She had a hysterectomy about 15 years ago. This was for bleeding / cysts. No cervix. Both ovaries removed as well.     She currently feels: good.     No cp, sob, no n/v, no constipation or diarrhea. No f/s/c. No weight changes in the last 6 months.     She is a nurse - works in med/surg.     No SI.          Kidney Disease Hx:       h/o Kidney Problems: No  Family h/o Genetic Kidney Disease: No       h/o HTN: No    Usual BP: Not checked       h/o Protein in Urine: No  h/o Blood in Urine: No       h/o Kidney Stones: No  h/o  Kidney Injury: No       h/o Recurrent UTI: No  h/o Genitourinary Problems: No       h/o Chronic NSAID Use: No         Other Medical Hx:       h/o DM: No   h/o Gestational DM: No       h/o Preeclampsia: No          h/o Gastrointestinal, Pancreas or Liver Problems: No       h/o Lung or Heart Problems: No       h/o Hematologic Problems: No  h/o Bleeding or Clotting Problems: No       h/o Cancer: No       h/o Infection Problems: No         Skin Cancer Risk:       h/o more than 50 moles: No       h/o extensive sun exposure: No       h/o melanoma: No       Family h/o melanoma: No         Mental Health Assessment:       h/o Depression: No       h/o Psychiatric Illness: No       h/o Suicidal Attempt(s): No    ROS:   A comprehensive review of systems was obtained and negative, except as noted in the HPI or PMH.    PMH:   History was taken from the patient as noted below.  HYPOthyroidism    PSH:   Hysterectomy    Personal or family history of anesthesia problems: No    Family Hx:        Specific Family Hx:       FH of DM: Yes - father - diagnosed in adulthood       FH of CAD: No  FH of HTN: No       FH of Cancer: Yes - sister with esophageal CA  FH of Kidney Cancer: No    Personal Hx:   Social History     Social History     Marital status: Single     Spouse name: N/A     Number of children: N/A     Years of education: N/A     Occupational History     Not on file.     Social History Main Topics     Smoking status: Never Smoker     Smokeless tobacco: Never Used     Alcohol use Yes      Comment: socially     Drug use: No     Sexual activity: Not on file     Other Topics Concern     Not on file     Social History Narrative     No narrative on file          Specific Social Hx:       Health Insurance Status: Yes       Employment Status: Full time  Occupation: Nurse                       Living Arrangements: with significant other       Social Support: Yes       Presence of increased risk for disease transmission behaviors as  "defined by PHS guidelines: No        Allergies:  No Known Allergies    Medications:  Prior to Admission medications    Medication Sig Start Date End Date Taking? Authorizing Provider   estradiol (ESTRACE) 1 MG tablet Take 1 mg by mouth 8/29/17   Reported, Patient   levothyroxine (SYNTHROID/LEVOTHROID) 150 MCG tablet Take 150 mcg by mouth 8/25/17   Reported, Patient   sertraline (ZOLOFT) 100 MG tablet Take 100 mg by mouth 8/25/17   Reported, Patient   VITAMIN D, CHOLECALCIFEROL, PO Take 5,000 Units by mouth daily    Reported, Patient   ranitidine (ZANTAC) 150 MG tablet Take 150 mg by mouth 2 times daily    Reported, Patient       Vitals:  Vital Signs 9/21/2017 9/21/2017   Systolic - 112   Diastolic - 77   Pulse - 67   Temperature - 97.8   Respirations - 16   Weight (LB) - 165 lb 1.6 oz   Height - 5' 3\"   BMI (Calculated) - 29.31   Pain 0 -   O2 - 97     BP Readings from Last 1 Encounters:   09/21/17 112/77   ]  Exam:   GENERAL APPEARANCE: alert and no distress  HENT: mouth without ulcers or lesions  LYMPHATICS: no cervical or supraclavicular nodes  RESP: lungs clear to auscultation - no rales, rhonchi or wheezes  CV: regular rhythm, normal rate, no rub, no murmur  EDEMA: no LE edema bilaterally  ABDOMEN: soft, nondistended, nontender, bowel sounds normal  MS: extremities normal - no gross deformities noted, no evidence of inflammation in joints, no muscle tenderness  SKIN: no rash    Results:   Labs and imaging were ordered for this visit and reviewed by me.  Recent Results (from the past 336 hour(s))   ABO/Rh type and screen    Collection Time: 09/21/17  8:23 AM   Result Value Ref Range    ABO O     RH(D) Pos     Antibody Screen Neg     Test Valid Only At          [facility]    Specimen Expires 09/24/2017    Lipid Profile    Collection Time: 09/21/17  8:23 AM   Result Value Ref Range    Cholesterol 196 <200 mg/dL    Triglycerides 117 <150 mg/dL    HDL Cholesterol 71 >49 " mg/dL    LDL Cholesterol Calculated 102 (H) <100 mg/dL    Non HDL Cholesterol 125 <130 mg/dL   Comprehensive metabolic panel    Collection Time: 09/21/17  8:23 AM   Result Value Ref Range    Sodium 140 133 - 144 mmol/L    Potassium 4.0 3.4 - 5.3 mmol/L    Chloride 107 94 - 109 mmol/L    Carbon Dioxide 27 20 - 32 mmol/L    Anion Gap 6 3 - 14 mmol/L    Glucose 98 70 - 99 mg/dL    Urea Nitrogen 12 7 - 30 mg/dL    Creatinine 0.70 0.52 - 1.04 mg/dL    GFR Estimate 90 >60 mL/min/1.7m2    GFR Estimate If Black >90 >60 mL/min/1.7m2    Calcium 8.7 8.5 - 10.1 mg/dL    Bilirubin Total 0.4 0.2 - 1.3 mg/dL    Albumin 3.8 3.4 - 5.0 g/dL    Protein Total 7.2 6.8 - 8.8 g/dL    Alkaline Phosphatase 98 40 - 150 U/L    ALT 19 0 - 50 U/L    AST 17 0 - 45 U/L   Phosphorus    Collection Time: 09/21/17  8:23 AM   Result Value Ref Range    Phosphorus 2.4 (L) 2.5 - 4.5 mg/dL   Hemoglobin A1c    Collection Time: 09/21/17  8:23 AM   Result Value Ref Range    Hemoglobin A1C 5.4 4.3 - 6.0 %   INR    Collection Time: 09/21/17  8:23 AM   Result Value Ref Range    INR 0.95 0.86 - 1.14   Partial thromboplastin time    Collection Time: 09/21/17  8:23 AM   Result Value Ref Range    PTT 30 22 - 37 sec   CBC with platelets    Collection Time: 09/21/17  8:23 AM   Result Value Ref Range    WBC 6.2 4.0 - 11.0 10e9/L    RBC Count 4.68 3.8 - 5.2 10e12/L    Hemoglobin 13.9 11.7 - 15.7 g/dL    Hematocrit 41.4 35.0 - 47.0 %    MCV 89 78 - 100 fl    MCH 29.7 26.5 - 33.0 pg    MCHC 33.6 31.5 - 36.5 g/dL    RDW 12.3 10.0 - 15.0 %    Platelet Count 224 150 - 450 10e9/L   Uric acid    Collection Time: 09/21/17  8:23 AM   Result Value Ref Range    Uric Acid 4.6 2.6 - 6.0 mg/dL   Routine UA with microscopic    Collection Time: 09/21/17  8:28 AM   Result Value Ref Range    Color Urine Yellow     Appearance Urine Clear     Glucose Urine Negative NEG^Negative mg/dL    Bilirubin Urine Negative NEG^Negative    Ketones Urine Negative NEG^Negative mg/dL    Specific Gravity  Urine 1.024 1.003 - 1.035    Blood Urine Negative NEG^Negative    pH Urine 6.0 5.0 - 7.0 pH    Protein Albumin Urine Negative NEG^Negative mg/dL    Urobilinogen mg/dL 2.0 0.0 - 2.0 mg/dL    Nitrite Urine Negative NEG^Negative    Leukocyte Esterase Urine Negative NEG^Negative    Source Midstream Urine     WBC Urine 1 0 - 2 /HPF    RBC Urine 1 0 - 2 /HPF    Bacteria Urine Few (A) NEG^Negative /HPF    Squamous Epithelial /HPF Urine 1 0 - 1 /HPF    Mucous Urine Present (A) NEG^Negative /LPF   Microalbumin quantitative random urine    Collection Time: 09/21/17  8:28 AM   Result Value Ref Range    Creatinine Urine 154 mg/dL    Albumin Urine mg/L 9 mg/L    Albumin Urine mg/g Cr 5.95 0 - 25 mg/g Cr   Protein  random urine    Collection Time: 09/21/17  8:28 AM   Result Value Ref Range    Protein Random Urine 0.09 g/L    Protein Total Urine g/gr Creatinine 0.06 0 - 0.2 g/g Cr   EKG 12-lead complete w/read - Clinics    Collection Time: 09/21/17 12:22 PM   Result Value Ref Range    Interpretation ECG Click View Image link to view waveform and result              Again, thank you for allowing me to participate in the care of your patient.      Sincerely,    Aubrey Thompson MD

## 2017-09-21 NOTE — PROGRESS NOTES
Donor Iohexol test    Lucille Lim presents today to [facility] for a Donor Iohexol test.      Progress note:  ID verified by name and .     The following information was verified with the patient:  Female Patients is there any possibility of being pregnant No  Is there a history of allergy (skin rash, swelling, ect) to:   A.  Iodine (except skin reactions to betadine): No   B. Intravenous radio-contrast agents: No   C. Seafood No    R.N. provided patient with educational handout regarding timed test. Yes    20 gauge PIV placed in left AC.  Iohexol administered.  Following iohexol administration extension set replaced.  PIV left in place for blood draws and CT this afternoon    Medication administered:  Iohexol (Omnipaque 300mg iodine/ml concentration) 5 mls.    Start time: 846  Stop time: 848    Drug Waste Record    Drug Name: iohexol  Dose: 5ml  Route administered: IV  NDC #: 0407-1413-10  Amount of waste(mL):5ml  Reason for waste: Single use vial      Evaluation nurse in transplant to draw labs at 2 and 4 hours post iohexol administration.  Patient given a slip with the times to get labs drawn and verbalized understanding of the plan.    Patient tolerated the procedure:  Yes    After the infusion patient was discharged to the next appointment.

## 2017-09-21 NOTE — MR AVS SNAPSHOT
After Visit Summary   9/21/2017    Lucille Lim    MRN: [id number]           Patient Information     Date Of Birth          1970        Visit Information        Provider Department      9/21/2017 8:30 AM UC 47 ATC; UC SPEC INFUSION [facility] Specialty and Procedure        Today's Diagnoses     Transplant donor evaluation    -  1       Follow-ups after your visit        Your next 10 appointments already scheduled     Sep 21, 2017  2:00 PM CDT   NUTRITION VISIT with Juanita Carmichael RD   [facility] Solid Organ Transplant ([facility])    [address]  3rd Floor  [facility] [id number] [phone]            Sep 21, 2017  3:00 PM CDT   (Arrive by 2:45 PM)   CT RENAL ANGIO with UCCT2   [facility] [place] [facility] ([facility])    [address]  1st Floor  [facility] [id number] [phone]           Please bring any scans or X-rays taken at other hospitals, if similar tests were done. Also bring a list of your medicines, including vitamins, minerals and over-the-counter drugs. It is safest to leave personal items at home.  Be sure to tell your doctor:   If you have any allergies.   If there s any chance you are pregnant.   If you are breastfeeding.   If you have any special needs.  You will have contrast for this exam. To prepare:   Do not eat or drink for 2 hours before your exam. If you need to take medicine, you may take it with small sips of water. (We may ask you to take liquid medicine as well.)   The day before your exam, drink extra fluids at least six 8-ounce glasses (unless your doctor tells you to restrict your fluids).  Patients over 70 or patients with diabetes or kidney problems:   If you haven t had a blood test (creatinine test) within the last 30 days, go to your clinic or Diagnostic Imaging Department for this test.  If you have diabetes:   If your kidney function is normal,  continue taking your metformin (Avandamet, Glucophage, Glucovance, Metaglip) on the day of your exam.   If your kidney function is abnormal, wait 48 hours before restarting this medicine.  Please wear loose clothing, such as a sweat suit or jogging clothes. Avoid snaps, zippers and other metal. We may ask you to undress and put on a hospital gown.  If you have any questions, please call the Imaging Department where you will have your exam.            Sep 21, 2017  3:20 PM CDT   (Arrive by 3:05 PM)   XR CHEST 2 VIEWS with UCXR1   [facility] [place] [facility] ([facility])    [address]  1st Floor  [facility] [address] [phone]           Please bring a list of your current medicines to your exam. (Include vitamins, minerals and over-thecounter medicines.) Leave your valuables at home.  Tell your doctor if there is a chance you may be pregnant.  You do not need to do anything special for this exam.              Future tests that were ordered for you today     Open Future Orders        Priority Expected Expires Ordered    ABO type Routine 9/21/2017 10/27/2017 9/21/2017            Who to contact     If you have questions or need follow up information about today's clinic visit or your schedule please contact [facility] TREATMENT [place] SPECIALTY AND PROCEDURE directly at [phone].  Normal or non-critical lab and imaging results will be communicated to you by WorkSnughart, letter or phone within 4 business days after the clinic has received the results. If you do not hear from us within 7 days, please contact the clinic through WorkSnughart or phone. If you have a critical or abnormal lab result, we will notify you by phone as soon as possible.  Submit refill requests through [employer] or call your pharmacy and they will forward the refill request to us. Please allow 3 business days for your refill to be completed.          Additional Information About Your Visit        [employer]  "Information     hiogi lets you send messages to your doctor, view your test results, renew your prescriptions, schedule appointments and more. To sign up, go to [email or web address] . Click on \"Log in\" on the left side of the screen, which will take you to the Welcome page. Then click on \"Sign up Now\" on the right side of the page.     You will be asked to enter the access code listed below, as well as some personal information. Please follow the directions to create your username and password.     Your access code is: [email or web address]  Expires: 2017  6:30 AM     Your access code will  in 90 days. If you need help or a new code, please call your [facility] clinic or [phone].        Care EveryWhere ID     This is your Care EveryWhere ID. This could be used by other organizations to access your [facility] medical records  [id number]         Blood Pressure from Last 3 Encounters:   17 112/77    Weight from Last 3 Encounters:   17 74.9 kg (165 lb 1.6 oz)              Today, you had the following     No orders found for display       Primary Care Provider    Provider Not In System                Equal Access to Services     [facility]: Hadii vanessa Allan, waaxda susi, qaybta kaalmada cedricda, nixon malcolm . So [facility] [place] [phone].    ATENCIÓN: Si habla español, tiene a campos disposición servicios gratuitos de asistencia lingüística. Llame al [phone].    We comply with applicable federal civil rights laws and [place] laws. We do not discriminate on the basis of race, color, national origin, age, disability sex, sexual orientation or gender identity.            Thank you!     Thank you for choosing [facility] SPECIALTY AND PROCEDURE  for your care. Our goal is always to provide you with excellent care. Hearing back from our patients is one way we can continue to improve our services. Please take a few minutes to complete " the written survey that you may receive in the mail after your visit with us. Thank you!             Your Updated Medication List - Protect others around you: Learn how to safely use, store and throw away your medicines at www.disposemymeds.org.          This list is accurate as of: 9/21/17  1:33 PM.  Always use your most recent med list.                   Brand Name Dispense Instructions for use Diagnosis    estradiol 1 MG tablet    ESTRACE     Take 1 mg by mouth        levothyroxine 150 MCG tablet    SYNTHROID/LEVOTHROID     Take 150 mcg by mouth        sertraline 100 MG tablet    ZOLOFT     Take 100 mg by mouth        VITAMIN D (CHOLECALCIFEROL) PO      Take 5,000 Units by mouth daily        ZANTAC 150 MG tablet   Generic drug:  ranitidine      Take 150 mg by mouth 2 times daily

## 2017-09-21 NOTE — LETTER
9/21/2017       RE: Lucille Lim  [address] [address] [facility] [address]     Dear Colleague,    Thank you for referring your patient, Lucille Lim, to the [facility] SOLID ORGAN TRANSPLANT at [facility]. Please see a copy of my visit note below.    HPI  Patient wants to donate a kidney to [facility] but not DIRECTLY, she prefers to be in a paired exchange    ROS    Negative except for hypothyroidism and had a hysterectomy. On sythroid  Physical Exam  GENERAL APPEARANCE: alert and no distress  EYES: PERRL  HENT: mouth without ulcers or lesions  NECK: supple, no adenopathy  RESP: lungs clear to auscultation - no rales, rhonchi or wheezes  CV: regular rhythm, normal rate, no rub   ABDOMEN:  soft, nontender, hysterectomy scar healed well no HSM or masses and bowel sounds normal  MS: extremities normal- no gross deformities noted, no evidence of inflammation in joints, no muscle tenderness  SKIN: no rash  NEURO: Normal strength and tone, sensory exam grossly normal, mentation intact and speech normal  PSYCH: mentation appears normal. and affect normal/bright    Discussion    The patient (donor) was given a full informed consent about kidney donation process, including the evaluation, surgical in-hospital process, follow-up and long term probabilities.  The patient was given the opportunity to ask questions.  At the conclusion of the meeting, the patient expressed acknowledgment of the following:    The patient was told of the three types of organ donation: Cadaveric, living unrelated donor, and living related donor.  By donating a kidney, the recipient would not have a cure for kidney failure.  The recipient will have to take medication, adhere to unusual precautions and be under the watchful care of physicians, frequently and constantly.      The patient was told that every precaution would need to be taken to assess kidney function to determine the appropriateness of donation.  There  will be an extensive evaluation to attempt to assure that complications will be minimized.  However, even with this evaluation, there could be complications.    The patient was told that all information associated with the donation process will be confidential and will not be shared with the recipient or other non-medical interested parties.  Information about kidney transplantation and donor surgery in general, including complications was provided.  Possible surgical risks include not exclusively: risk of death, heart attack, stroke, infection, need for blood transfusion (with possible exposure to HIV and hepatitis infection), in addition to transfusion reactions along with surgical risk of urine leak, soreness and pain associated with the incision and intubation, were explained.      The patient was told that there are two surgical procedure will be  Hand assisted  laparoscopic nephrectomy, and explaine to her  what is involved in each procedure.  The type of incisions, locations, pain, bleeding, infection, and other complications were explained.  The postoperative and hospitalization period was explained.  The postoperative period in the hospital was stated to be about four to seven days.  Returning to work could be three to six weeks, if there are no complications.  Postoperatively, the patient will require outpatient follow-up visits with the physician.    It was also explained that there is no guarantee that the kidney donated will function immediately and that there is a risk that the transplanted kidney will never function.  Statistical information regarding immediate and long-term results of kidney transplantation was explained.     The patient was told that there is a risk associated with living with one kidney, should trauma or disease affect that kidney.  Furthermore, there is a slightly higher chance of high blood pressure resulting from living with one kidney.  Should kidney failure result, the patient  would require renal replacement therapy.  The patient was advised that there will be a need to take precautions about medications while living with one kidney and that medication should be taken with the advice of a physician.  The donation process is irretrievable.  The patient may not ask for the kidney to be returned following surgery.  When the recipient dies, the kidney may not be placed back into the donor.      The patient has stated that there are no monetary, direct or indirect, incentives to donating the kidney, nor are other perverse reasons, incentives, pressures, or any outstanding process that are requiring that donation take place.  The patient stated that this is totally voluntary.      The patient understands all of the risks and issues and wishes to proceed.    Total time spent = 45 min direct face to face counselling=30 min    CC  SELF, REFERRED    Copy to patient  [address] [address] [place] [id number]      Again, thank you for allowing me to participate in the care of your patient.      Sincerely,    Oswald Go MD

## 2017-09-21 NOTE — PROGRESS NOTES
HPI  Patient wants to donate a kidney to [facility] but not DIRECTLY, she prefers to be in a paired exchange    ROS    Negative except for hypothyroidism and had a hysterectomy. On sythroid  Physical Exam  GENERAL APPEARANCE: alert and no distress  EYES: PERRL  HENT: mouth without ulcers or lesions  NECK: supple, no adenopathy  RESP: lungs clear to auscultation - no rales, rhonchi or wheezes  CV: regular rhythm, normal rate, no rub   ABDOMEN:  soft, nontender, hysterectomy scar healed well no HSM or masses and bowel sounds normal  MS: extremities normal- no gross deformities noted, no evidence of inflammation in joints, no muscle tenderness  SKIN: no rash  NEURO: Normal strength and tone, sensory exam grossly normal, mentation intact and speech normal  PSYCH: mentation appears normal. and affect normal/bright    Discussion    The patient (donor) was given a full informed consent about kidney donation process, including the evaluation, surgical in-hospital process, follow-up and long term probabilities.  The patient was given the opportunity to ask questions.  At the conclusion of the meeting, the patient expressed acknowledgment of the following:    The patient was told of the three types of organ donation: Cadaveric, living unrelated donor, and living related donor.  By donating a kidney, the recipient would not have a cure for kidney failure.  The recipient will have to take medication, adhere to unusual precautions and be under the watchful care of physicians, frequently and constantly.      The patient was told that every precaution would need to be taken to assess kidney function to determine the appropriateness of donation.  There will be an extensive evaluation to attempt to assure that complications will be minimized.  However, even with this evaluation, there could be complications.    The patient was told that all information associated with the donation process will be confidential and will not be shared  with the recipient or other non-medical interested parties.  Information about kidney transplantation and donor surgery in general, including complications was provided.  Possible surgical risks include not exclusively: risk of death, heart attack, stroke, infection, need for blood transfusion (with possible exposure to HIV and hepatitis infection), in addition to transfusion reactions along with surgical risk of urine leak, soreness and pain associated with the incision and intubation, were explained.      The patient was told that there are two surgical procedure will be  Hand assisted  laparoscopic nephrectomy, and explaine to her  what is involved in each procedure.  The type of incisions, locations, pain, bleeding, infection, and other complications were explained.  The postoperative and hospitalization period was explained.  The postoperative period in the hospital was stated to be about four to seven days.  Returning to work could be three to six weeks, if there are no complications.  Postoperatively, the patient will require outpatient follow-up visits with the physician.    It was also explained that there is no guarantee that the kidney donated will function immediately and that there is a risk that the transplanted kidney will never function.  Statistical information regarding immediate and long-term results of kidney transplantation was explained.     The patient was told that there is a risk associated with living with one kidney, should trauma or disease affect that kidney.  Furthermore, there is a slightly higher chance of high blood pressure resulting from living with one kidney.  Should kidney failure result, the patient would require renal replacement therapy.  The patient was advised that there will be a need to take precautions about medications while living with one kidney and that medication should be taken with the advice of a physician.  The donation process is irretrievable.  The patient may  not ask for the kidney to be returned following surgery.  When the recipient dies, the kidney may not be placed back into the donor.      The patient has stated that there are no monetary, direct or indirect, incentives to donating the kidney, nor are other perverse reasons, incentives, pressures, or any outstanding process that are requiring that donation take place.  The patient stated that this is totally voluntary.      The patient understands all of the risks and issues and wishes to proceed.    Total time spent = 45 min direct face to face counselling=30 min  CC  SELF, REFERRED    Copy to patient     [address] [address] [place] [id number]

## 2017-09-21 NOTE — PROGRESS NOTES
"NUTRITION KIDNEY DONOR EVALUATION  Medical Nutrition Therapy    Weight and BMI:  Current BMI: 29.2  Recommend BMI remain <30 or <169 lbs     8 Year Risk of Diabetes:  </= 3%       Visit Type: Initial Assessment    Lucille Lim referred by Dr. Singh for MNT related to potential kidney donor evaluation    Patient accompanied by self    Nutrition Assessment:  Anthropometrics  Height:   63  in   BMI:    29.2    Weight Status:Overweight BMI 25-29.9   Weight:  165 lbs            IBW (lbs): 115  % IBW: 143    Wt Hx: No major changes but current weight is highest weight she has been. Her weight cycles yearly, down to ~150 lbs after working out in anticipation for going to [place] and then regains weight after trip.     Adj/dosing BW: 128 lbs/58 kg    Goal BMI <30 prior to donation or <169 lbs (or per MD)        Recent Labs   Lab Test  09/21/17   0823   CHOL  196   HDL  71   LDL  102*   TRIG  117       BG = 98  A1C = 5.4      Prediction of Incident Diabetes Mellitus in Middle-aged Adults: The [facility] Offspring Study  Sean Knox MD; James B. Meigs, MD, MPH; Yesenia Rader, PhD; Elke Wilkerson MD, MPH; Ernie Fairchild MD; Enoch Akers Sr,   PhD  Pt's estimated risk for T2DM (per Table 6 above)  Pt received points for the following criteria: BMI>25, parental h/o DM (dad)  Total points: 5  8-Year risk of T2DM: </= 3%    Nutrition History  Dining out/food not made at home: 2x/week   Vitamins, Supplements, Pertinent Meds: vit D, B complex occasionally   Herbal Medicines/Supplements: none    Recall:  B: none  L: salad or something \"quick at home\"  D: grazes during the day - granola bars, veggie potato chips, yogurt, PB treats, donuts/cookies occasionally   Beverages: water, coffee, diet sweet tea  ETOH (1 drink = 12 oz beer, 5 oz wine, 1.5 oz liquor): 1-2 drinks/month     Physical Activity  None; but plans to start going to the gym (cardio, weights) on 10/1, as she has done in the past      Nutrition " Prescription  Estimated Energy Needs: 2711-5579 kcals (20-25 Kcal/Kg)  Justification: overweight    Estimated Protein Needs: 60-75 protein (0.8-1 g pro/Kg)  Justification: maintenance    Estimated Fluid Needs:  (1 mL/Kcal)  Justification: maintenance     Fiber: 25-30 g/day     Nutrition Diagnosis:  Food and nutrition related knowledge deficit r/t pre transplant donor eval pt AEB pt verbalized not hearing pre/post transplant diet guidelines.    Nutrition Intervention:  Nutrition education provided:  Reviewed overall healthy diet guidelines for pre and post kidney donation. Discussed maintenance of a healthy weight, Na+ intake <3000 mg/day, and avoidance of herbal supplements post donation d/t unknown effects on the kidney.     Patient Understanding: Pt verbalized understanding of education provided.  Expected Compliance: Good  Follow-Up Plans: PRN     Nutrition Goals:  1. Na+ <3000 mg/day  2. BMI to remain <30 or <169 lbs    Provided pt with contact info.    Time spent with patient: 15 minutes.  Juanita Carmichael, RD, LD

## 2017-09-21 NOTE — MR AVS SNAPSHOT
After Visit Summary   9/21/2017    Lucille Lim    MRN: [id number]           Patient Information     Date Of Birth          1970        Visit Information        Provider Department      9/21/2017 1:00 PM Aubrey Thompson MD [facility] Nephrology        Today's Diagnoses     Willing to be kidney donor    -  1    Pre-op exam           Follow-ups after your visit        Who to contact     If you have questions or need follow up information about today's clinic visit or your schedule please contact [facility] NEPHROLOGY directly at [phone].  Normal or non-critical lab and imaging results will be communicated to you by [employer], letter or phone within 4 business days after the clinic has received the results. If you do not hear from us within 7 days, please contact the clinic through [employer] or phone. If you have a critical or abnormal lab result, we will notify you by phone as soon as possible.  Submit refill requests through [employer] or call your pharmacy and they will forward the refill request to us. Please allow 3 business days for your refill to be completed.          Additional Information About Your Visit        MyChart Information     [employer] gives you secure access to your electronic health record. If you see a primary care provider, you can also send messages to your care team and make appointments. If you have questions, please call your primary care clinic.  If you do not have a primary care provider, please call [phone] and they will assist you.        Care EveryWhere ID     This is your Care EveryWhere ID. This could be used by other organizations to access your [facility] medical records  [id number]         Blood Pressure from Last 3 Encounters:   09/21/17 116/76   09/21/17 120/80   09/21/17 112/77    Weight from Last 3 Encounters:   09/21/17 74.9 kg (165 lb 1.6 oz)              Today, you had the following     No orders found for display       Primary Care Provider    None  Specified       No primary provider on file.        Equal Access to Services     [facility] CANDIDA : Hadii aad ku hadbrodymorgan Allan, palomapelon goldman, essencenixon talbot. So [facility] [phone].    ATENCIÓN: Si habla español, tiene a campos disposición servicios gratuitos de asistencia lingüística. Llame al [phone].    We comply with applicable federal civil rights laws and [place] laws. We do not discriminate on the basis of race, color, national origin, age, disability sex, sexual orientation or gender identity.            Thank you!     Thank you for choosing [facility] NEPHROLOGY  for your care. Our goal is always to provide you with excellent care. Hearing back from our patients is one way we can continue to improve our services. Please take a few minutes to complete the written survey that you may receive in the mail after your visit with us. Thank you!             Your Updated Medication List - Protect others around you: Learn how to safely use, store and throw away your medicines at www.disposemymeds.org.          This list is accurate as of: 9/21/17 11:59 PM.  Always use your most recent med list.                   Brand Name Dispense Instructions for use Diagnosis    estradiol 1 MG tablet    ESTRACE     Take 1 mg by mouth        levothyroxine 150 MCG tablet    SYNTHROID/LEVOTHROID     Take 150 mcg by mouth        sertraline 100 MG tablet    ZOLOFT     Take 100 mg by mouth        VITAMIN D (CHOLECALCIFEROL) PO      Take 5,000 Units by mouth daily        ZANTAC 150 MG tablet   Generic drug:  ranitidine      Take 150 mg by mouth 2 times daily

## 2017-09-21 NOTE — MR AVS SNAPSHOT
"              After Visit Summary   2017    Lucille Lim    MRN: [id number]           Patient Information     Date Of Birth          1970        Visit Information        Provider Department      2017 8:00 AM  [place] EVALUATION [facility] Solid Organ Transplant        Today's Diagnoses     Transplant donor evaluation    -  1       Follow-ups after your visit        Future tests that were ordered for you today     Open Future Orders        Priority Expected Expires Ordered    ABO type Routine 2017 10/27/2017 2017            Who to contact     If you have questions or need follow up information about today's clinic visit or your schedule please contact [facility] SOLID ORGAN TRANSPLANT directly at [phone].  Normal or non-critical lab and imaging results will be communicated to you by [employer], letter or phone within 4 business days after the clinic has received the results. If you do not hear from us within 7 days, please contact the clinic through [employer] or phone. If you have a critical or abnormal lab result, we will notify you by phone as soon as possible.  Submit refill requests through [employer] or call your pharmacy and they will forward the refill request to us. Please allow 3 business days for your refill to be completed.          Additional Information About Your Visit        MyChart Information     [employer] lets you send messages to your doctor, view your test results, renew your prescriptions, schedule appointments and more. To sign up, go to [email or web address] . Click on \"Log in\" on the left side of the screen, which will take you to the Welcome page. Then click on \"Sign up Now\" on the right side of the page.     You will be asked to enter the access code listed below, as well as some personal information. Please follow the directions to create your username and password.     Your access code is: [email or web address]  Expires: 2017  6:30 AM     Your access code will  in 90 " "days. If you need help or a new code, please call your [facility] clinic or [phone].        Care EveryWhere ID     This is your Care EveryWhere ID. This could be used by other organizations to access your [facility] medical records  [id number]        Your Vitals Were     Pulse Temperature Respirations Height Pulse Oximetry BMI (Body Mass Index)    67 97.8  F (36.6  C) (Oral) 16 1.6 m (5' 3\") 97% 29.25 kg/m2       Blood Pressure from Last 3 Encounters:   09/21/17 116/76   09/21/17 120/80   09/21/17 112/77    Weight from Last 3 Encounters:   09/21/17 74.9 kg (165 lb 1.6 oz)              Today, you had the following     No orders found for display       Primary Care Provider    Provider Not In System                Equal Access to Services     [facility]: Hadii vanessa Allan, waaxda luqadaha, qaybta kaalmada sergei, nixon malcolm . So [facility] [phone].    ATENCIÓN: Si habla español, tiene a campos disposición servicios gratuitos de asistencia lingüística. Eulogio al [phone].    We comply with applicable federal civil rights laws and [place] laws. We do not discriminate on the basis of race, color, national origin, age, disability sex, sexual orientation or gender identity.            Thank you!     Thank you for choosing [facility] SOLID ORGAN TRANSPLANT  for your care. Our goal is always to provide you with excellent care. Hearing back from our patients is one way we can continue to improve our services. Please take a few minutes to complete the written survey that you may receive in the mail after your visit with us. Thank you!             Your Updated Medication List - Protect others around you: Learn how to safely use, store and throw away your medicines at www.disposemymeds.org.          This list is accurate as of: 9/21/17  3:24 PM.  Always use your most recent med list.                   Brand Name Dispense Instructions for use Diagnosis    estradiol 1 MG tablet    " ESTRACE     Take 1 mg by mouth        levothyroxine 150 MCG tablet    SYNTHROID/LEVOTHROID     Take 150 mcg by mouth        sertraline 100 MG tablet    ZOLOFT     Take 100 mg by mouth        VITAMIN D (CHOLECALCIFEROL) PO      Take 5,000 Units by mouth daily        ZANTAC 150 MG tablet   Generic drug:  ranitidine      Take 150 mg by mouth 2 times daily

## 2017-09-21 NOTE — LETTER
9/21/2017       RE: Lucille Lim  [address] [address] [place] [address]     Dear Colleague,    Thank you for referring your patient, Lucille Lim, to the [facility] SOLID ORGAN TRANSPLANT at [facility]. Please see a copy of my visit note below.    Patient attended all appointments and completed all scheduled tests.  Patient to follow up with Transplant Coordinator.  Patient stated understanding and has contact numbers.      Again, thank you for allowing me to participate in the care of your patient.      Sincerely,    Transplant Evaluation Resource

## 2017-09-21 NOTE — PROGRESS NOTES
Assessment and Plan:  1. Prospective kidney transplant donor with some issues to be addressed prior to donation. Patient's blood pressure is acceptable at this visit, kidney function appears to be acceptable with Iohexol pending, and urinalysis is bland.    2. Mild hypophosphatemia: repeat in 2 weeks.     3. Bacteruria: asymptomatic with no leukocyturia. No treatment needed at this time.     4. + quantiferon: ID referral for evaluation and management of potential latent TB.    Discussed the risks of donating a kidney, including the surgical risk and the possible risks of living with one kidney.    I spent 45 minutes with this patient of which > 25 minutes were spent face to face counseling regarding risks, benefits and alternatives of being kidney donor. All questions were answered.    Education about expected post-donation kidney function and how chronic kidney disease (CKD) and end stage kidney disease (ESKD) might potentially impact the donor in the future, include, but not limited to:       - On average, donors will have 25-35% permanent loss of kidney function at donation.       - Baseline risk of ESKD may slightly exceed that of members of the general population with the same demographic profile.       - Donor risks must be interpreted in light of known epidemiology of both CKD or ESKD, such as that CKD generally develops in midlife (40-50 years old) and ESKD generally develops after age 60.       - Medical evaluation of young potential donors cannot predict lifetime risk of CKD or ESKD.       - Donors may be at higher risk for CKD if they sustain damage to the remaining kidney.       - Development of CKD and progression of ESKD may be more rapid with only 1 kidney.       - Some type of kidney replacement therapy, either kidney transplant or dialysis, is required when reaching ESKD.    Potential medical or surgical risks include, but not limited to:       - Death.       - Scars, pain, fatigue, and other  consequences typical of any surgical procedure.       - Decreased kidney function.       - Abdominal or bowel symptoms, such as bloating and nausea, and developing bowel obstruction.       - Kidney failure (ESKD) and the need for a kidney transplant or dialysis for the donor.       - Impact of obesity, hypertension, or other donor-specific medical conditions on morbidity and mortality of the potential donor.    Patients overall evaluation will be discussed with the transplant team and a final recommendation on the patients' suitability to be a kidney transplant donor will be made at that time.    Consult:  Lucille Lim was seen in consultation at the request of Dr. Oswald Go for evaluation as a potential kidney transplant donor.    Reason for Visit:  Lucille Lim is a 47 year old female who presents for a kidney donor evaluation.  Patient would like to donate to the pair exchange for a friend.    HPI:    Patient is a 46 yo female with history of hypothyroidism on levothyroxine. She developed weight gain and TSH was elevated. 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     She had a hysterectomy about 15 years ago. This was for bleeding / cysts. No cervix. Both ovaries removed as well.     She currently feels: good.     No cp, sob, no n/v, no constipation or diarrhea. No f/s/c. No weight changes in the last 6 months.     She is a nurse - works in med/surg.     No SI.          Kidney Disease Hx:       h/o Kidney Problems: No  Family h/o Genetic Kidney Disease: No       h/o HTN: No    Usual BP: Not checked       h/o Protein in Urine: No  h/o Blood in Urine: No       h/o Kidney Stones: No  h/o Kidney Injury: No       h/o Recurrent UTI: No  h/o Genitourinary Problems: No       h/o Chronic NSAID Use: No         Other Medical Hx:       h/o DM: No   h/o Gestational DM: No       h/o Preeclampsia: No          h/o Gastrointestinal, Pancreas or Liver Problems: No       h/o  Lung or Heart Problems: No       h/o Hematologic Problems: No  h/o Bleeding or Clotting Problems: No       h/o Cancer: No       h/o Infection Problems: No         Skin Cancer Risk:       h/o more than 50 moles: No       h/o extensive sun exposure: No       h/o melanoma: No       Family h/o melanoma: No         Mental Health Assessment:       h/o Depression: No       h/o Psychiatric Illness: No       h/o Suicidal Attempt(s): No    ROS:   A comprehensive review of systems was obtained and negative, except as noted in the HPI or PMH.    PMH:   History was taken from the patient as noted below.  HYPOthyroidism    PSH:   Hysterectomy    Personal or family history of anesthesia problems: No    Family Hx:        Specific Family Hx:       FH of DM: Yes - father - diagnosed in adulthood       FH of CAD: No  FH of HTN: No       FH of Cancer: Yes - sister with esophageal CA  FH of Kidney Cancer: No    Personal Hx:   Social History     Social History     Marital status: Single     Spouse name: N/A     Number of children: N/A     Years of education: N/A     Occupational History     Not on file.     Social History Main Topics     Smoking status: Never Smoker     Smokeless tobacco: Never Used     Alcohol use Yes      Comment: socially     Drug use: No     Sexual activity: Not on file     Other Topics Concern     Not on file     Social History Narrative     No narrative on file          Specific Social Hx:       Health Insurance Status: Yes       Employment Status: Full time  Occupation: Nurse                       Living Arrangements: with significant other       Social Support: Yes       Presence of increased risk for disease transmission behaviors as defined by PHS guidelines: No        Allergies:  No Known Allergies    Medications:  Prior to Admission medications    Medication Sig Start Date End Date Taking? Authorizing Provider   estradiol (ESTRACE) 1 MG tablet Take 1 mg by mouth 8/29/17   Reported, Patient   levothyroxine  "(SYNTHROID/LEVOTHROID) 150 MCG tablet Take 150 mcg by mouth 8/25/17   Reported, Patient   sertraline (ZOLOFT) 100 MG tablet Take 100 mg by mouth 8/25/17   Reported, Patient   VITAMIN D, CHOLECALCIFEROL, PO Take 5,000 Units by mouth daily    Reported, Patient   ranitidine (ZANTAC) 150 MG tablet Take 150 mg by mouth 2 times daily    Reported, Patient       Vitals:  Vital Signs 9/21/2017 9/21/2017   Systolic - 112   Diastolic - 77   Pulse - 67   Temperature - 97.8   Respirations - 16   Weight (LB) - 165 lb 1.6 oz   Height - 5' 3\"   BMI (Calculated) - 29.31   Pain 0 -   O2 - 97     BP Readings from Last 1 Encounters:   09/21/17 112/77   ]  Exam:   GENERAL APPEARANCE: alert and no distress  HENT: mouth without ulcers or lesions  LYMPHATICS: no cervical or supraclavicular nodes  RESP: lungs clear to auscultation - no rales, rhonchi or wheezes  CV: regular rhythm, normal rate, no rub, no murmur  EDEMA: no LE edema bilaterally  ABDOMEN: soft, nondistended, nontender, bowel sounds normal  MS: extremities normal - no gross deformities noted, no evidence of inflammation in joints, no muscle tenderness  SKIN: no rash    Results:   Labs and imaging were ordered for this visit and reviewed by me.  Recent Results (from the past 336 hour(s))   ABO/Rh type and screen    Collection Time: 09/21/17  8:23 AM   Result Value Ref Range    ABO O     RH(D) Pos     Antibody Screen Neg     Test Valid Only At          [facility]    Specimen Expires 09/24/2017    Lipid Profile    Collection Time: 09/21/17  8:23 AM   Result Value Ref Range    Cholesterol 196 <200 mg/dL    Triglycerides 117 <150 mg/dL    HDL Cholesterol 71 >49 mg/dL    LDL Cholesterol Calculated 102 (H) <100 mg/dL    Non HDL Cholesterol 125 <130 mg/dL   Comprehensive metabolic panel    Collection Time: 09/21/17  8:23 AM   Result Value Ref Range    Sodium 140 133 - 144 mmol/L    Potassium 4.0 3.4 - 5.3 mmol/L    Chloride 107 94 - 109 " mmol/L    Carbon Dioxide 27 20 - 32 mmol/L    Anion Gap 6 3 - 14 mmol/L    Glucose 98 70 - 99 mg/dL    Urea Nitrogen 12 7 - 30 mg/dL    Creatinine 0.70 0.52 - 1.04 mg/dL    GFR Estimate 90 >60 mL/min/1.7m2    GFR Estimate If Black >90 >60 mL/min/1.7m2    Calcium 8.7 8.5 - 10.1 mg/dL    Bilirubin Total 0.4 0.2 - 1.3 mg/dL    Albumin 3.8 3.4 - 5.0 g/dL    Protein Total 7.2 6.8 - 8.8 g/dL    Alkaline Phosphatase 98 40 - 150 U/L    ALT 19 0 - 50 U/L    AST 17 0 - 45 U/L   Phosphorus    Collection Time: 09/21/17  8:23 AM   Result Value Ref Range    Phosphorus 2.4 (L) 2.5 - 4.5 mg/dL   Hemoglobin A1c    Collection Time: 09/21/17  8:23 AM   Result Value Ref Range    Hemoglobin A1C 5.4 4.3 - 6.0 %   INR    Collection Time: 09/21/17  8:23 AM   Result Value Ref Range    INR 0.95 0.86 - 1.14   Partial thromboplastin time    Collection Time: 09/21/17  8:23 AM   Result Value Ref Range    PTT 30 22 - 37 sec   CBC with platelets    Collection Time: 09/21/17  8:23 AM   Result Value Ref Range    WBC 6.2 4.0 - 11.0 10e9/L    RBC Count 4.68 3.8 - 5.2 10e12/L    Hemoglobin 13.9 11.7 - 15.7 g/dL    Hematocrit 41.4 35.0 - 47.0 %    MCV 89 78 - 100 fl    MCH 29.7 26.5 - 33.0 pg    MCHC 33.6 31.5 - 36.5 g/dL    RDW 12.3 10.0 - 15.0 %    Platelet Count 224 150 - 450 10e9/L   Uric acid    Collection Time: 09/21/17  8:23 AM   Result Value Ref Range    Uric Acid 4.6 2.6 - 6.0 mg/dL   Routine UA with microscopic    Collection Time: 09/21/17  8:28 AM   Result Value Ref Range    Color Urine Yellow     Appearance Urine Clear     Glucose Urine Negative NEG^Negative mg/dL    Bilirubin Urine Negative NEG^Negative    Ketones Urine Negative NEG^Negative mg/dL    Specific Gravity Urine 1.024 1.003 - 1.035    Blood Urine Negative NEG^Negative    pH Urine 6.0 5.0 - 7.0 pH    Protein Albumin Urine Negative NEG^Negative mg/dL    Urobilinogen mg/dL 2.0 0.0 - 2.0 mg/dL    Nitrite Urine Negative NEG^Negative    Leukocyte Esterase Urine Negative NEG^Negative     Source Midstream Urine     WBC Urine 1 0 - 2 /HPF    RBC Urine 1 0 - 2 /HPF    Bacteria Urine Few (A) NEG^Negative /HPF    Squamous Epithelial /HPF Urine 1 0 - 1 /HPF    Mucous Urine Present (A) NEG^Negative /LPF   Microalbumin quantitative random urine    Collection Time: 09/21/17  8:28 AM   Result Value Ref Range    Creatinine Urine 154 mg/dL    Albumin Urine mg/L 9 mg/L    Albumin Urine mg/g Cr 5.95 0 - 25 mg/g Cr   Protein  random urine    Collection Time: 09/21/17  8:28 AM   Result Value Ref Range    Protein Random Urine 0.09 g/L    Protein Total Urine g/gr Creatinine 0.06 0 - 0.2 g/g Cr   EKG 12-lead complete w/read - Clinics    Collection Time: 09/21/17 12:22 PM   Result Value Ref Range    Interpretation ECG Click View Image link to view waveform and result

## 2017-09-21 NOTE — MR AVS SNAPSHOT
After Visit Summary   9/21/2017    Lucille Lim    MRN: [id number]           Patient Information     Date Of Birth          1970        Visit Information        Provider Department      9/21/2017 2:00 PM Juanita Carmichael RD M [facility] Solid [facility] Transplant        Today's Diagnoses     Transplant donor evaluation    -  1       Follow-ups after your visit        Your next 10 appointments already scheduled     Sep 21, 2017  2:00 PM CDT   NUTRITION VISIT with HAIDER Shook [facility]plant ([facility])    [address]  3rd Floor  [place] [id number] [phone]            Sep 21, 2017  3:00 PM CDT   (Arrive by 2:45 PM)   CT RENAL ANGIO with UCCT2   [facility] CT ([facility])    [address]  1st Floor  [place] [id number] [phone]           Please bring any scans or X-rays taken at other hospitals, if similar tests were done. Also bring a list of your medicines, including vitamins, minerals and over-the-counter drugs. It is safest to leave personal items at home.  Be sure to tell your doctor:   If you have any allergies.   If there s any chance you are pregnant.   If you are breastfeeding.   If you have any special needs.  You will have contrast for this exam. To prepare:   Do not eat or drink for 2 hours before your exam. If you need to take medicine, you may take it with small sips of water. (We may ask you to take liquid medicine as well.)   The day before your exam, drink extra fluids at least six 8-ounce glasses (unless your doctor tells you to restrict your fluids).  Patients over 70 or patients with diabetes or kidney problems:   If you haven t had a blood test (creatinine test) within the last 30 days, go to your clinic or Diagnostic Imaging Department for this test.  If you have diabetes:   If your kidney function is normal, continue taking your metformin  (Avandamet, Glucophage, Glucovance, Metaglip) on the day of your exam.   If your kidney function is abnormal, wait 48 hours before restarting this medicine.  Please wear loose clothing, such as a sweat suit or jogging clothes. Avoid snaps, zippers and other metal. We may ask you to undress and put on a hospital gown.  If you have any questions, please call the Imaging Department where you will have your exam.            Sep 21, 2017  3:20 PM CDT   (Arrive by 3:05 PM)   XR CHEST 2 VIEWS with UCXR1   [facility]ray ([facility])    [address]  1st Floor  [place] [address] [phone]           Please bring a list of your current medicines to your exam. (Include vitamins, minerals and over-thecounter medicines.) Leave your valuables at home.  Tell your doctor if there is a chance you may be pregnant.  You do not need to do anything special for this exam.              Future tests that were ordered for you today     Open Future Orders        Priority Expected Expires Ordered    ABO type Routine 9/21/2017 10/27/2017 9/21/2017            Who to contact     If you have questions or need follow up information about today's clinic visit or your schedule please contact [facility] directly at [phone].  Normal or non-critical lab and imaging results will be communicated to you by [employer], letter or phone within 4 business days after the clinic has received the results. If you do not hear from us within 7 days, please contact the clinic through [employer] or phone. If you have a critical or abnormal lab result, we will notify you by phone as soon as possible.  Submit refill requests through [employer] or call your pharmacy and they will forward the refill request to us. Please allow 3 business days for your refill to be completed.          Additional Information About Your Visit        [employer] Information     [employer] lets you send messages to your  "doctor, view your test results, renew your prescriptions, schedule appointments and more. To sign up, go to [email or web address] . Click on \"Log in\" on the left side of the screen, which will take you to the Welcome page. Then click on \"Sign up Now\" on the right side of the page.     You will be asked to enter the access code listed below, as well as some personal information. Please follow the directions to create your username and password.     Your access code is: [email or web address]  Expires: 2017  6:30 AM     Your access code will  in 90 days. If you need help or a new code, please call your [facility] clinic or [phone].        Care EveryWhere ID     This is your Care EveryWhere ID. This could be used by other organizations to access your [facility] medical records  [id number]         Blood Pressure from Last 3 Encounters:   17 112/77    Weight from Last 3 Encounters:   17 74.9 kg (165 lb 1.6 oz)              Today, you had the following     No orders found for display       Primary Care Provider    Provider Not In System                Equal Access to Services     [facility]: Hadii aad ku hadbrodyo Sojoby, waaxda lukristi, qaybta kaalmada sergei, nixon malcolm . So [facility] [phone].    ATENCIÓN: Si habla español, tiene a campos disposición servicios gratuitos de asistencia lingüística. Llame al [phone].    We comply with applicable federal civil rights laws and [place] laws. We do not discriminate on the basis of race, color, national origin, age, disability sex, sexual orientation or gender identity.            Thank you!     Thank you for choosing [facility]  for your care. Our goal is always to provide you with excellent care. Hearing back from our patients is one way we can continue to improve our services. Please take a few minutes to complete the written survey that you may receive in the mail after your visit with us. Thank " you!             Your Updated Medication List - Protect others around you: Learn how to safely use, store and throw away your medicines at www.disposemymeds.org.          This list is accurate as of: 9/21/17  1:49 PM.  Always use your most recent med list.                   Brand Name Dispense Instructions for use Diagnosis    estradiol 1 MG tablet    ESTRACE     Take 1 mg by mouth        levothyroxine 150 MCG tablet    SYNTHROID/LEVOTHROID     Take 150 mcg by mouth        sertraline 100 MG tablet    ZOLOFT     Take 100 mg by mouth        VITAMIN D (CHOLECALCIFEROL) PO      Take 5,000 Units by mouth daily        ZANTAC 150 MG tablet   Generic drug:  ranitidine      Take 150 mg by mouth 2 times daily

## 2017-09-22 LAB
INTERPRETATION ECG - MUSE: NORMAL
M TB TUBERC IFN-G BLD QL: POSITIVE
M TB TUBERC IFN-G/MITOGEN IGNF BLD: 1.27 IU/ML

## 2017-09-25 LAB
BSA: 1.85 M2
IOHEXOL CL UR+SERPL-VRATE: 4.9 MG/DL
IOHEXOL CL UR+SERPL-VRATE: 78 /1.73 M2
IOHEXOL CL UR+SERPL-VRATE: 84 ML/MIN
IOHEXOL CL UR+SERPL-VRATE: 9.82 MG/DL

## 2017-09-27 ENCOUNTER — TELEPHONE (OUTPATIENT)
Dept: TRANSPLANT | Facility: CLINIC | Age: 47
End: 2017-09-27

## 2017-09-27 ENCOUNTER — COMMITTEE REVIEW (OUTPATIENT)
Dept: TRANSPLANT | Facility: CLINIC | Age: 47
End: 2017-09-27

## 2017-09-27 NOTE — COMMITTEE REVIEW
Abdominal Committee Review Note     Evaluation Date:   Committee Review Date: 9/27/2017    Organ being evaluated for: Kidney    Transplant Phase:   Transplant Status:     Transplant Coordinator: No matching coordinators  Transplant Surgeon:       Referring Physician: Referred Self    Primary Diagnosis:   Secondary Diagnosis:     Committee Review Members:  Nephrology Sanju Samano MD   Nutrition Brendon Galindo, RD   Pharmacy Rylan Zuniga [facility]    - Clinical Carmina Hunter, [facility], Holli Hughes, [facility]   Transplant Zoila Tariq, Prince Singh MD, Urmila Tirado LPN, Desi Toro RN       Transplant Eligibility: Acceptable Mental Health    Committee Review Decision: Needs Re-presentation    Relative Contraindications: If Quantiferon Gold test positive, must complete 8 wks treatment prior to donation    Absolute Contraindications: None    Committee Chair Prince Singh MD verbally attested to the committee's decision.    Committee Discussion Details: Positive TB Quant, patient needs to see Infectious Disease for treatment for latent TB.  CT chest for two myla nodules visualized on the CT angio.  Recheck Phosphorus.  Litholink panel due to stone on CT angio.    CT reviewed and laterality is choice kidney.

## 2017-10-05 ENCOUNTER — TELEPHONE (OUTPATIENT)
Dept: TRANSPLANT | Facility: CLINIC | Age: 47
End: 2017-10-05

## 2017-10-05 NOTE — TELEPHONE ENCOUNTER
I received a call back from Lucille, she stated she was very busy at work and was difficult to return my message.  I reviewed the following are the recommendations from the Donor Team:  1.  Litholink stone former panel due to non obstructing stone on right kidney.  2.  CTchest is needed due to two tiny nodules, she will need to go to local imaging to have done.  3.  Her TB test was positive, she will need to see infectious disease for treatment of latent TB. She will call her PCP.  4.  Recheck phosphorus and add PtH and Vit D.  I have faxed order for litholink panel.  She stated her records should be visible for her PCP using care everywhere.

## 2017-10-10 ENCOUNTER — MEDICAL CORRESPONDENCE (OUTPATIENT)
Dept: TRANSPLANT | Facility: CLINIC | Age: 47
End: 2017-10-10

## 2017-10-22 ENCOUNTER — TRANSFERRED RECORDS (OUTPATIENT)
Dept: HEALTH INFORMATION MANAGEMENT | Facility: CLINIC | Age: 47
End: 2017-10-22

## 2017-10-31 ENCOUNTER — TELEPHONE (OUTPATIENT)
Dept: TRANSPLANT | Facility: CLINIC | Age: 47
End: 2017-10-31

## 2017-10-31 NOTE — TELEPHONE ENCOUNTER
I called the patient to review with her the good news that her intended recipient has received a transplant by a  donor.  Bebe stated she was already aware of this happy news.  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  She stated she has the chest CT scheduled and had done the labs and her PCP has been tracking.  Also PCP team will be seeing her for TB positive care plan.  I will review with our team tomorrow the litholink stone former panel which was just received in the office.  She will need a copy of this after we have the recommendations.  I thanked her for her interest in donation.   I will call her tomorrow with the update from the litholink panel assessment.

## 2017-11-01 ENCOUNTER — TELEPHONE (OUTPATIENT)
Dept: TRANSPLANT | Facility: CLINIC | Age: 47
End: 2017-11-01

## 2017-11-01 ENCOUNTER — DOCUMENTATION ONLY (OUTPATIENT)
Dept: TRANSPLANT | Facility: CLINIC | Age: 47
End: 2017-11-01

## 2017-11-01 NOTE — PROGRESS NOTES
Reviewed her litholink panel with nutritionist Nell, recommend increase fluid intake.  Goal of 12 cups per day because urine volume is low.

## 2018-01-07 ENCOUNTER — HEALTH MAINTENANCE LETTER (OUTPATIENT)
Age: 48
End: 2018-01-07

## 2020-03-10 ENCOUNTER — HEALTH MAINTENANCE LETTER (OUTPATIENT)
Age: 50
End: 2020-03-10

## 2020-12-27 ENCOUNTER — HEALTH MAINTENANCE LETTER (OUTPATIENT)
Age: 50
End: 2020-12-27

## 2021-04-24 ENCOUNTER — HEALTH MAINTENANCE LETTER (OUTPATIENT)
Age: 51
End: 2021-04-24

## 2021-10-09 ENCOUNTER — HEALTH MAINTENANCE LETTER (OUTPATIENT)
Age: 51
End: 2021-10-09

## 2022-01-23 ENCOUNTER — HEALTH MAINTENANCE LETTER (OUTPATIENT)
Age: 52
End: 2022-01-23

## 2022-05-16 ENCOUNTER — HEALTH MAINTENANCE LETTER (OUTPATIENT)
Age: 52
End: 2022-05-16

## 2022-09-11 ENCOUNTER — HEALTH MAINTENANCE LETTER (OUTPATIENT)
Age: 52
End: 2022-09-11

## 2023-05-06 ENCOUNTER — HEALTH MAINTENANCE LETTER (OUTPATIENT)
Age: 53
End: 2023-05-06

## 2023-06-03 ENCOUNTER — HEALTH MAINTENANCE LETTER (OUTPATIENT)
Age: 53
End: 2023-06-03